# Patient Record
Sex: FEMALE | Race: BLACK OR AFRICAN AMERICAN | Employment: FULL TIME | URBAN - METROPOLITAN AREA
[De-identification: names, ages, dates, MRNs, and addresses within clinical notes are randomized per-mention and may not be internally consistent; named-entity substitution may affect disease eponyms.]

---

## 2017-09-25 ENCOUNTER — HOSPITAL ENCOUNTER (OUTPATIENT)
Dept: MAMMOGRAPHY | Age: 37
Discharge: HOME | End: 2017-09-26
Attending: FAMILY MEDICINE | Admitting: FAMILY MEDICINE

## 2017-09-25 DIAGNOSIS — Z12.31 VISIT FOR SCREENING MAMMOGRAM: ICD-10-CM

## 2017-10-09 ENCOUNTER — HOSPITAL ENCOUNTER (OUTPATIENT)
Dept: OTHER | Age: 37
Discharge: HOME | End: 2017-10-09

## 2019-11-15 ENCOUNTER — HOSPITAL ENCOUNTER (OUTPATIENT)
Dept: MAMMOGRAPHY | Age: 39
Discharge: HOME | End: 2019-11-15
Payer: COMMERCIAL

## 2019-11-15 DIAGNOSIS — Z12.31 ENCOUNTER FOR SCREENING MAMMOGRAM FOR BREAST CANCER: ICD-10-CM

## 2019-11-15 PROCEDURE — 77063 BREAST TOMOSYNTHESIS BI: CPT

## 2021-02-15 ENCOUNTER — ANESTHESIA EVENT (OUTPATIENT)
Dept: OPERATING ROOM | Age: 41
End: 2021-02-15
Payer: COMMERCIAL

## 2021-02-15 NOTE — ANESTHESIA PRE PROCEDURE
Counseling given: Not Answered      Vital Signs (Current): There were no vitals filed for this visit. BP Readings from Last 3 Encounters:   05/05/18 (!) 139/90   03/15/17 (!) 130/94   10/31/14 116/78       NPO Status:                                                                                 BMI:   Wt Readings from Last 3 Encounters:   05/05/18 185 lb (83.9 kg)   03/15/17 185 lb (83.9 kg)   10/31/14 191 lb (86.6 kg)     There is no height or weight on file to calculate BMI.    CBC:   Lab Results   Component Value Date    WBC 8.2 07/27/2012    RBC 4.47 07/27/2012    HGB 12.4 07/27/2012    HCT 36.8 07/27/2012    MCV 82.2 07/27/2012    RDW 13.1 07/27/2012     07/27/2012       CMP:   Lab Results   Component Value Date     07/27/2012    K 4.2 07/27/2012     07/27/2012    CO2 26 07/27/2012    BUN 10 07/27/2012    CREATININE 0.8 07/27/2012    GFRAA >60 07/27/2012    LABGLOM >60 07/27/2012    PROT 7.1 07/27/2012    CALCIUM 9.7 07/27/2012    BILITOT 0.3 07/27/2012    ALKPHOS 111 07/27/2012    AST 21 07/27/2012    ALT 19 07/27/2012       POC Tests: No results for input(s): POCGLU, POCNA, POCK, POCCL, POCBUN, POCHEMO, POCHCT in the last 72 hours.     Coags: No results found for: PROTIME, INR, APTT    HCG (If Applicable): No results found for: PREGTESTUR, PREGSERUM, HCG, HCGQUANT     ABGs: No results found for: PHART, PO2ART, CEY2QPG, NIL3ZNH, BEART, J9WIUNED     Type & Screen (If Applicable):  No results found for: LABABO, LABRH    Drug/Infectious Status (If Applicable):  No results found for: HIV, HEPCAB    COVID-19 Screening (If Applicable): No results found for: COVID19      Anesthesia Evaluation    Airway: Mallampati: II  TM distance: >3 FB   Neck ROM: full  Mouth opening: > = 3 FB Dental:          Pulmonary:normal exam    (+) asthma:                            Cardiovascular:  Exercise tolerance: good (>4 METS), Beta Blocker:  Not on Beta Blocker         Neuro/Psych:               GI/Hepatic/Renal:             Endo/Other:                     Abdominal:           Vascular:                                      Anesthesia Plan      general     ASA 2       Induction: intravenous. MIPS: Postoperative opioids intended. Anesthetic plan and risks discussed with patient. Plan discussed with CRNA. Attending anesthesiologist reviewed and agrees with Pre Eval content     Pre Anesthesia Assessment complete.  Chart reviewed on 2/15/2021        BRAXTON Knutson - CRNA   2/15/2021

## 2021-02-15 NOTE — PROGRESS NOTES
Surgery:    [email or web address]                      Arrival time: 0800  Nothing to eat or drink after midnight unless instructed to take certain medications by the doctor or the nurse the am of surgery  Arrive at the front information desk -1st floor /\[facility]\"" is on [address] [facility]  Please leave money and all other valuables at home. Wear comfortable clothing. If you wear contacts please bring a case. No make up. You may be asked to remove rings or body piercing. Please bring insurance cards and picture ID am of procedure. Please bring any consent or paper work from your doctor. If you become ill,such as a cold, sore throat or fever contact your doctor. Please bathe or shower am of procedure.   Medications to take AM of procedure:     Any questions call \[facility]\""  [phone]

## 2021-02-16 ENCOUNTER — ANESTHESIA (OUTPATIENT)
Dept: OPERATING ROOM | Age: 41
End: 2021-02-16
Payer: COMMERCIAL

## 2021-02-16 ENCOUNTER — HOSPITAL ENCOUNTER (OUTPATIENT)
Age: 41
Setting detail: OBSERVATION
Discharge: HOME | End: 2021-02-17
Attending: OBSTETRICS & GYNECOLOGY | Admitting: OBSTETRICS & GYNECOLOGY
Payer: COMMERCIAL

## 2021-02-16 VITALS
DIASTOLIC BLOOD PRESSURE: 102 MMHG | OXYGEN SATURATION: 97 % | SYSTOLIC BLOOD PRESSURE: 134 MMHG | RESPIRATION RATE: 2 BRPM | TEMPERATURE: 98.6 F

## 2021-02-16 DIAGNOSIS — N92.0 EXCESSIVE AND FREQUENT MENSTRUATION: ICD-10-CM

## 2021-02-16 DIAGNOSIS — G89.18 POST-OP PAIN: Primary | ICD-10-CM

## 2021-02-16 PROBLEM — N94.6 MENORRHALGIA: Status: ACTIVE | Noted: 2021-02-16

## 2021-02-16 LAB
BASOPHILS ABSOLUTE: 0.1 K/CU MM
BASOPHILS RELATIVE PERCENT: 0.9 % (ref 0–1)
DIFFERENTIAL TYPE: ABNORMAL
EOSINOPHILS ABSOLUTE: 0.1 K/CU MM
EOSINOPHILS RELATIVE PERCENT: 2.1 % (ref 0–3)
HCT VFR BLD CALC: 37.1 % (ref 37–47)
HEMOGLOBIN: 12.2 GM/DL (ref 12.5–16)
IMMATURE NEUTROPHIL %: 0.3 % (ref 0–0.43)
LYMPHOCYTES ABSOLUTE: 2.1 K/CU MM
LYMPHOCYTES RELATIVE PERCENT: 31.5 % (ref 24–44)
MCH RBC QN AUTO: 26.5 PG (ref 27–31)
MCHC RBC AUTO-ENTMCNC: 32.9 % (ref 32–36)
MCV RBC AUTO: 80.5 FL (ref 78–100)
MONOCYTES ABSOLUTE: 0.5 K/CU MM
MONOCYTES RELATIVE PERCENT: 7.4 % (ref 0–4)
NUCLEATED RBC %: 0 %
PDW BLD-RTO: 13.7 % (ref 11.7–14.9)
PLATELET # BLD: 281 K/CU MM (ref 140–440)
PMV BLD AUTO: 10.5 FL (ref 7.5–11.1)
PREGNANCY TEST URINE, POC: NEGATIVE
RBC # BLD: 4.61 M/CU MM (ref 4.2–5.4)
SEGMENTED NEUTROPHILS ABSOLUTE COUNT: 3.8 K/CU MM
SEGMENTED NEUTROPHILS RELATIVE PERCENT: 57.8 % (ref 36–66)
TOTAL IMMATURE NEUTOROPHIL: 0.02 K/CU MM
TOTAL NUCLEATED RBC: 0 K/CU MM
WBC # BLD: 6.6 K/CU MM (ref 4–10.5)

## 2021-02-16 PROCEDURE — G0378 HOSPITAL OBSERVATION PER HR: HCPCS

## 2021-02-16 PROCEDURE — 6370000000 HC RX 637 (ALT 250 FOR IP): Performed by: OBSTETRICS & GYNECOLOGY

## 2021-02-16 PROCEDURE — 6360000002 HC RX W HCPCS: Performed by: ANESTHESIOLOGY

## 2021-02-16 PROCEDURE — 2580000003 HC RX 258: Performed by: ANESTHESIOLOGY

## 2021-02-16 PROCEDURE — 85025 COMPLETE CBC W/AUTO DIFF WBC: CPT

## 2021-02-16 PROCEDURE — 3600000014 HC SURGERY LEVEL 4 ADDTL 15MIN: Performed by: OBSTETRICS & GYNECOLOGY

## 2021-02-16 PROCEDURE — 2580000003 HC RX 258: Performed by: NURSE ANESTHETIST, CERTIFIED REGISTERED

## 2021-02-16 PROCEDURE — 2580000003 HC RX 258: Performed by: OBSTETRICS & GYNECOLOGY

## 2021-02-16 PROCEDURE — 81025 URINE PREGNANCY TEST: CPT

## 2021-02-16 PROCEDURE — 2720000010 HC SURG SUPPLY STERILE: Performed by: OBSTETRICS & GYNECOLOGY

## 2021-02-16 PROCEDURE — 6360000002 HC RX W HCPCS: Performed by: OBSTETRICS & GYNECOLOGY

## 2021-02-16 PROCEDURE — 7100000000 HC PACU RECOVERY - FIRST 15 MIN: Performed by: OBSTETRICS & GYNECOLOGY

## 2021-02-16 PROCEDURE — 3700000000 HC ANESTHESIA ATTENDED CARE: Performed by: OBSTETRICS & GYNECOLOGY

## 2021-02-16 PROCEDURE — 3600000004 HC SURGERY LEVEL 4 BASE: Performed by: OBSTETRICS & GYNECOLOGY

## 2021-02-16 PROCEDURE — 2500000003 HC RX 250 WO HCPCS: Performed by: NURSE ANESTHETIST, CERTIFIED REGISTERED

## 2021-02-16 PROCEDURE — 6360000002 HC RX W HCPCS: Performed by: NURSE ANESTHETIST, CERTIFIED REGISTERED

## 2021-02-16 PROCEDURE — 7100000001 HC PACU RECOVERY - ADDTL 15 MIN: Performed by: OBSTETRICS & GYNECOLOGY

## 2021-02-16 PROCEDURE — 3700000001 HC ADD 15 MINUTES (ANESTHESIA): Performed by: OBSTETRICS & GYNECOLOGY

## 2021-02-16 PROCEDURE — 2500000003 HC RX 250 WO HCPCS: Performed by: OBSTETRICS & GYNECOLOGY

## 2021-02-16 PROCEDURE — 2709999900 HC NON-CHARGEABLE SUPPLY: Performed by: OBSTETRICS & GYNECOLOGY

## 2021-02-16 PROCEDURE — 88307 TISSUE EXAM BY PATHOLOGIST: CPT | Performed by: PATHOLOGY

## 2021-02-16 RX ORDER — IBUPROFEN 800 MG/1
800 TABLET ORAL EVERY 8 HOURS PRN
Status: DISCONTINUED | OUTPATIENT
Start: 2021-02-16 | End: 2021-02-17 | Stop reason: HOSPADM

## 2021-02-16 RX ORDER — PROPOFOL 10 MG/ML
INJECTION, EMULSION INTRAVENOUS PRN
Status: DISCONTINUED | OUTPATIENT
Start: 2021-02-16 | End: 2021-02-16 | Stop reason: SDUPTHER

## 2021-02-16 RX ORDER — SODIUM CHLORIDE 0.9 % (FLUSH) 0.9 %
10 SYRINGE (ML) INJECTION PRN
Status: DISCONTINUED | OUTPATIENT
Start: 2021-02-16 | End: 2021-02-17 | Stop reason: HOSPADM

## 2021-02-16 RX ORDER — FENTANYL CITRATE 50 UG/ML
50 INJECTION, SOLUTION INTRAMUSCULAR; INTRAVENOUS EVERY 5 MIN PRN
Status: DISCONTINUED | OUTPATIENT
Start: 2021-02-16 | End: 2021-02-16 | Stop reason: HOSPADM

## 2021-02-16 RX ORDER — ROCURONIUM BROMIDE 10 MG/ML
INJECTION, SOLUTION INTRAVENOUS PRN
Status: DISCONTINUED | OUTPATIENT
Start: 2021-02-16 | End: 2021-02-16 | Stop reason: SDUPTHER

## 2021-02-16 RX ORDER — ONDANSETRON 2 MG/ML
4 INJECTION INTRAMUSCULAR; INTRAVENOUS EVERY 6 HOURS PRN
Status: DISCONTINUED | OUTPATIENT
Start: 2021-02-16 | End: 2021-02-17 | Stop reason: HOSPADM

## 2021-02-16 RX ORDER — ONDANSETRON 2 MG/ML
4 INJECTION INTRAMUSCULAR; INTRAVENOUS
Status: DISCONTINUED | OUTPATIENT
Start: 2021-02-16 | End: 2021-02-16 | Stop reason: HOSPADM

## 2021-02-16 RX ORDER — PROMETHAZINE HYDROCHLORIDE 25 MG/ML
6.25 INJECTION, SOLUTION INTRAMUSCULAR; INTRAVENOUS
Status: DISCONTINUED | OUTPATIENT
Start: 2021-02-16 | End: 2021-02-16 | Stop reason: HOSPADM

## 2021-02-16 RX ORDER — ACETAMINOPHEN 325 MG/1
650 TABLET ORAL EVERY 4 HOURS PRN
Status: DISCONTINUED | OUTPATIENT
Start: 2021-02-16 | End: 2021-02-17 | Stop reason: HOSPADM

## 2021-02-16 RX ORDER — HYDROCODONE BITARTRATE AND ACETAMINOPHEN 5; 325 MG/1; MG/1
1 TABLET ORAL PRN
Status: DISCONTINUED | OUTPATIENT
Start: 2021-02-16 | End: 2021-02-16 | Stop reason: HOSPADM

## 2021-02-16 RX ORDER — FENTANYL CITRATE 50 UG/ML
INJECTION, SOLUTION INTRAMUSCULAR; INTRAVENOUS PRN
Status: DISCONTINUED | OUTPATIENT
Start: 2021-02-16 | End: 2021-02-16 | Stop reason: SDUPTHER

## 2021-02-16 RX ORDER — ONDANSETRON 2 MG/ML
INJECTION INTRAMUSCULAR; INTRAVENOUS PRN
Status: DISCONTINUED | OUTPATIENT
Start: 2021-02-16 | End: 2021-02-16 | Stop reason: SDUPTHER

## 2021-02-16 RX ORDER — 0.9 % SODIUM CHLORIDE 0.9 %
500 INTRAVENOUS SOLUTION INTRAVENOUS
Status: DISCONTINUED | OUTPATIENT
Start: 2021-02-16 | End: 2021-02-16 | Stop reason: HOSPADM

## 2021-02-16 RX ORDER — SODIUM CHLORIDE, SODIUM LACTATE, POTASSIUM CHLORIDE, CALCIUM CHLORIDE 600; 310; 30; 20 MG/100ML; MG/100ML; MG/100ML; MG/100ML
INJECTION, SOLUTION INTRAVENOUS CONTINUOUS PRN
Status: DISCONTINUED | OUTPATIENT
Start: 2021-02-16 | End: 2021-02-16 | Stop reason: SDUPTHER

## 2021-02-16 RX ORDER — OXYCODONE HYDROCHLORIDE AND ACETAMINOPHEN 5; 325 MG/1; MG/1
2 TABLET ORAL EVERY 4 HOURS PRN
Status: DISCONTINUED | OUTPATIENT
Start: 2021-02-16 | End: 2021-02-17 | Stop reason: HOSPADM

## 2021-02-16 RX ORDER — CELECOXIB 200 MG/1
200 CAPSULE ORAL ONCE
Status: COMPLETED | OUTPATIENT
Start: 2021-02-16 | End: 2021-02-16

## 2021-02-16 RX ORDER — LIDOCAINE HYDROCHLORIDE 20 MG/ML
INJECTION, SOLUTION INTRAVENOUS PRN
Status: DISCONTINUED | OUTPATIENT
Start: 2021-02-16 | End: 2021-02-16 | Stop reason: SDUPTHER

## 2021-02-16 RX ORDER — DEXAMETHASONE SODIUM PHOSPHATE 4 MG/ML
INJECTION, SOLUTION INTRA-ARTICULAR; INTRALESIONAL; INTRAMUSCULAR; INTRAVENOUS; SOFT TISSUE PRN
Status: DISCONTINUED | OUTPATIENT
Start: 2021-02-16 | End: 2021-02-16 | Stop reason: SDUPTHER

## 2021-02-16 RX ORDER — ALBUTEROL SULFATE 90 UG/1
2 AEROSOL, METERED RESPIRATORY (INHALATION) EVERY 4 HOURS PRN
Status: DISCONTINUED | OUTPATIENT
Start: 2021-02-16 | End: 2021-02-17 | Stop reason: HOSPADM

## 2021-02-16 RX ORDER — SODIUM CHLORIDE, SODIUM LACTATE, POTASSIUM CHLORIDE, CALCIUM CHLORIDE 600; 310; 30; 20 MG/100ML; MG/100ML; MG/100ML; MG/100ML
INJECTION, SOLUTION INTRAVENOUS CONTINUOUS
Status: DISCONTINUED | OUTPATIENT
Start: 2021-02-16 | End: 2021-02-17 | Stop reason: HOSPADM

## 2021-02-16 RX ORDER — HYDROMORPHONE HCL 110MG/55ML
PATIENT CONTROLLED ANALGESIA SYRINGE INTRAVENOUS PRN
Status: DISCONTINUED | OUTPATIENT
Start: 2021-02-16 | End: 2021-02-16 | Stop reason: SDUPTHER

## 2021-02-16 RX ORDER — SODIUM CHLORIDE 0.9 % (FLUSH) 0.9 %
10 SYRINGE (ML) INJECTION EVERY 12 HOURS SCHEDULED
Status: DISCONTINUED | OUTPATIENT
Start: 2021-02-16 | End: 2021-02-17 | Stop reason: HOSPADM

## 2021-02-16 RX ORDER — OXYCODONE HYDROCHLORIDE AND ACETAMINOPHEN 5; 325 MG/1; MG/1
1 TABLET ORAL EVERY 4 HOURS PRN
Status: DISCONTINUED | OUTPATIENT
Start: 2021-02-16 | End: 2021-02-17 | Stop reason: HOSPADM

## 2021-02-16 RX ORDER — DIPHENHYDRAMINE HYDROCHLORIDE 50 MG/ML
12.5 INJECTION INTRAMUSCULAR; INTRAVENOUS
Status: DISCONTINUED | OUTPATIENT
Start: 2021-02-16 | End: 2021-02-16 | Stop reason: HOSPADM

## 2021-02-16 RX ORDER — KETAMINE HCL 50MG/ML(1)
SYRINGE (ML) INTRAVENOUS PRN
Status: DISCONTINUED | OUTPATIENT
Start: 2021-02-16 | End: 2021-02-16 | Stop reason: SDUPTHER

## 2021-02-16 RX ORDER — ONDANSETRON 4 MG/1
4 TABLET, ORALLY DISINTEGRATING ORAL EVERY 8 HOURS PRN
Status: DISCONTINUED | OUTPATIENT
Start: 2021-02-16 | End: 2021-02-17 | Stop reason: HOSPADM

## 2021-02-16 RX ORDER — BUPIVACAINE HYDROCHLORIDE AND EPINEPHRINE 5; 5 MG/ML; UG/ML
INJECTION, SOLUTION EPIDURAL; INTRACAUDAL; PERINEURAL
Status: COMPLETED | OUTPATIENT
Start: 2021-02-16 | End: 2021-02-16

## 2021-02-16 RX ORDER — SODIUM CHLORIDE, SODIUM LACTATE, POTASSIUM CHLORIDE, CALCIUM CHLORIDE 600; 310; 30; 20 MG/100ML; MG/100ML; MG/100ML; MG/100ML
INJECTION, SOLUTION INTRAVENOUS CONTINUOUS
Status: DISCONTINUED | OUTPATIENT
Start: 2021-02-16 | End: 2021-02-16

## 2021-02-16 RX ORDER — PHENAZOPYRIDINE HYDROCHLORIDE 100 MG/1
200 TABLET, FILM COATED ORAL ONCE
Status: COMPLETED | OUTPATIENT
Start: 2021-02-16 | End: 2021-02-16

## 2021-02-16 RX ORDER — ACETAMINOPHEN 500 MG
1000 TABLET ORAL ONCE
Status: COMPLETED | OUTPATIENT
Start: 2021-02-16 | End: 2021-02-16

## 2021-02-16 RX ORDER — HYDROMORPHONE HCL 110MG/55ML
0.25 PATIENT CONTROLLED ANALGESIA SYRINGE INTRAVENOUS
Status: DISCONTINUED | OUTPATIENT
Start: 2021-02-16 | End: 2021-02-17 | Stop reason: HOSPADM

## 2021-02-16 RX ORDER — BUPIVACAINE HYDROCHLORIDE 5 MG/ML
INJECTION, SOLUTION PERINEURAL
Status: COMPLETED | OUTPATIENT
Start: 2021-02-16 | End: 2021-02-16

## 2021-02-16 RX ORDER — HYDROMORPHONE HCL 110MG/55ML
0.5 PATIENT CONTROLLED ANALGESIA SYRINGE INTRAVENOUS EVERY 5 MIN PRN
Status: DISCONTINUED | OUTPATIENT
Start: 2021-02-16 | End: 2021-02-16 | Stop reason: HOSPADM

## 2021-02-16 RX ORDER — HYDROMORPHONE HCL 110MG/55ML
0.5 PATIENT CONTROLLED ANALGESIA SYRINGE INTRAVENOUS
Status: DISCONTINUED | OUTPATIENT
Start: 2021-02-16 | End: 2021-02-17 | Stop reason: HOSPADM

## 2021-02-16 RX ORDER — HYDROCODONE BITARTRATE AND ACETAMINOPHEN 5; 325 MG/1; MG/1
2 TABLET ORAL EVERY 6 HOURS PRN
Status: DISCONTINUED | OUTPATIENT
Start: 2021-02-16 | End: 2021-02-16 | Stop reason: HOSPADM

## 2021-02-16 RX ORDER — MEPERIDINE HYDROCHLORIDE 25 MG/ML
12.5 INJECTION INTRAMUSCULAR; INTRAVENOUS; SUBCUTANEOUS EVERY 5 MIN PRN
Status: DISCONTINUED | OUTPATIENT
Start: 2021-02-16 | End: 2021-02-16 | Stop reason: HOSPADM

## 2021-02-16 RX ORDER — HYDRALAZINE HYDROCHLORIDE 20 MG/ML
5 INJECTION INTRAMUSCULAR; INTRAVENOUS EVERY 10 MIN PRN
Status: DISCONTINUED | OUTPATIENT
Start: 2021-02-16 | End: 2021-02-16 | Stop reason: HOSPADM

## 2021-02-16 RX ORDER — LABETALOL HYDROCHLORIDE 5 MG/ML
5 INJECTION, SOLUTION INTRAVENOUS EVERY 10 MIN PRN
Status: DISCONTINUED | OUTPATIENT
Start: 2021-02-16 | End: 2021-02-16 | Stop reason: HOSPADM

## 2021-02-16 RX ADMIN — PROPOFOL 160 MG: 10 INJECTION, EMULSION INTRAVENOUS at 09:22

## 2021-02-16 RX ADMIN — Medication 50 MG: at 09:22

## 2021-02-16 RX ADMIN — LIDOCAINE HYDROCHLORIDE 100 MG: 20 INJECTION, SOLUTION INTRAVENOUS at 09:22

## 2021-02-16 RX ADMIN — HYDROMORPHONE HYDROCHLORIDE 0.4 MG: 2 INJECTION INTRAMUSCULAR; INTRAVENOUS; SUBCUTANEOUS at 10:55

## 2021-02-16 RX ADMIN — SODIUM CHLORIDE, POTASSIUM CHLORIDE, SODIUM LACTATE AND CALCIUM CHLORIDE: 600; 310; 30; 20 INJECTION, SOLUTION INTRAVENOUS at 11:40

## 2021-02-16 RX ADMIN — SODIUM CHLORIDE, POTASSIUM CHLORIDE, SODIUM LACTATE AND CALCIUM CHLORIDE: 600; 310; 30; 20 INJECTION, SOLUTION INTRAVENOUS at 18:21

## 2021-02-16 RX ADMIN — PHENAZOPYRIDINE HYDROCHLORIDE 200 MG: 100 TABLET ORAL at 09:10

## 2021-02-16 RX ADMIN — OXYCODONE HYDROCHLORIDE AND ACETAMINOPHEN 1 TABLET: 5; 325 TABLET ORAL at 23:22

## 2021-02-16 RX ADMIN — FENTANYL CITRATE 50 MCG: 50 INJECTION INTRAMUSCULAR; INTRAVENOUS at 11:48

## 2021-02-16 RX ADMIN — IBUPROFEN 800 MG: 800 TABLET, FILM COATED ORAL at 21:52

## 2021-02-16 RX ADMIN — ROCURONIUM BROMIDE 50 MG: 10 INJECTION INTRAVENOUS at 09:22

## 2021-02-16 RX ADMIN — SODIUM CHLORIDE, POTASSIUM CHLORIDE, SODIUM LACTATE AND CALCIUM CHLORIDE: 600; 310; 30; 20 INJECTION, SOLUTION INTRAVENOUS at 09:16

## 2021-02-16 RX ADMIN — SODIUM CHLORIDE, POTASSIUM CHLORIDE, SODIUM LACTATE AND CALCIUM CHLORIDE: 600; 310; 30; 20 INJECTION, SOLUTION INTRAVENOUS at 09:02

## 2021-02-16 RX ADMIN — ROCURONIUM BROMIDE 20 MG: 10 INJECTION INTRAVENOUS at 10:06

## 2021-02-16 RX ADMIN — CEFAZOLIN SODIUM 2 MG: 10 INJECTION, POWDER, FOR SOLUTION INTRAVENOUS at 09:31

## 2021-02-16 RX ADMIN — FENTANYL CITRATE 100 MCG: 50 INJECTION INTRAMUSCULAR; INTRAVENOUS at 09:22

## 2021-02-16 RX ADMIN — ONDANSETRON 4 MG: 2 INJECTION INTRAMUSCULAR; INTRAVENOUS at 09:22

## 2021-02-16 RX ADMIN — DEXAMETHASONE SODIUM PHOSPHATE 8 MG: 4 INJECTION, SOLUTION INTRAMUSCULAR; INTRAVENOUS at 09:22

## 2021-02-16 RX ADMIN — SUGAMMADEX 172 MG: 100 INJECTION, SOLUTION INTRAVENOUS at 10:55

## 2021-02-16 RX ADMIN — CELECOXIB 200 MG: 200 CAPSULE ORAL at 09:10

## 2021-02-16 RX ADMIN — FENTANYL CITRATE 50 MCG: 50 INJECTION INTRAMUSCULAR; INTRAVENOUS at 11:31

## 2021-02-16 RX ADMIN — ACETAMINOPHEN 1000 MG: 500 TABLET ORAL at 09:10

## 2021-02-16 RX ADMIN — OXYCODONE HYDROCHLORIDE AND ACETAMINOPHEN 1 TABLET: 5; 325 TABLET ORAL at 18:20

## 2021-02-16 RX ADMIN — OXYCODONE HYDROCHLORIDE AND ACETAMINOPHEN 2 TABLET: 5; 325 TABLET ORAL at 13:28

## 2021-02-16 ASSESSMENT — PULMONARY FUNCTION TESTS
PIF_VALUE: 0
PIF_VALUE: 28
PIF_VALUE: 0
PIF_VALUE: 18
PIF_VALUE: 27
PIF_VALUE: 13
PIF_VALUE: 0
PIF_VALUE: 13
PIF_VALUE: 13
PIF_VALUE: 28
PIF_VALUE: 0
PIF_VALUE: 27
PIF_VALUE: 13
PIF_VALUE: 0
PIF_VALUE: 28
PIF_VALUE: 0
PIF_VALUE: 30
PIF_VALUE: 0
PIF_VALUE: 0
PIF_VALUE: 28
PIF_VALUE: 0
PIF_VALUE: 13
PIF_VALUE: 5
PIF_VALUE: 0
PIF_VALUE: 28
PIF_VALUE: 19
PIF_VALUE: 13
PIF_VALUE: 0
PIF_VALUE: 0
PIF_VALUE: 29
PIF_VALUE: 0
PIF_VALUE: 28
PIF_VALUE: 13
PIF_VALUE: 0
PIF_VALUE: 0
PIF_VALUE: 13
PIF_VALUE: 0
PIF_VALUE: 26
PIF_VALUE: 0
PIF_VALUE: 19
PIF_VALUE: 13
PIF_VALUE: 30
PIF_VALUE: 0
PIF_VALUE: 31
PIF_VALUE: 0
PIF_VALUE: 0

## 2021-02-16 ASSESSMENT — PAIN DESCRIPTION - ORIENTATION
ORIENTATION: LEFT
ORIENTATION: LOWER
ORIENTATION: MID

## 2021-02-16 ASSESSMENT — PAIN DESCRIPTION - LOCATION
LOCATION: ABDOMEN
LOCATION: ABDOMEN;SHOULDER
LOCATION: ABDOMEN

## 2021-02-16 ASSESSMENT — PAIN SCALES - GENERAL
PAINLEVEL_OUTOF10: 9
PAINLEVEL_OUTOF10: 6
PAINLEVEL_OUTOF10: 5
PAINLEVEL_OUTOF10: 3
PAINLEVEL_OUTOF10: 0
PAINLEVEL_OUTOF10: 1
PAINLEVEL_OUTOF10: 0
PAINLEVEL_OUTOF10: 10

## 2021-02-16 ASSESSMENT — PAIN DESCRIPTION - FREQUENCY
FREQUENCY: INTERMITTENT
FREQUENCY: CONTINUOUS

## 2021-02-16 ASSESSMENT — PAIN DESCRIPTION - DESCRIPTORS
DESCRIPTORS: ACHING;DISCOMFORT
DESCRIPTORS: ACHING;DISCOMFORT
DESCRIPTORS: DISCOMFORT

## 2021-02-16 ASSESSMENT — PAIN - FUNCTIONAL ASSESSMENT: PAIN_FUNCTIONAL_ASSESSMENT: ACTIVITIES ARE NOT PREVENTED

## 2021-02-16 ASSESSMENT — PAIN DESCRIPTION - ONSET: ONSET: GRADUAL

## 2021-02-16 ASSESSMENT — PAIN DESCRIPTION - PROGRESSION
CLINICAL_PROGRESSION: NOT CHANGED
CLINICAL_PROGRESSION: GRADUALLY IMPROVING
CLINICAL_PROGRESSION: GRADUALLY WORSENING

## 2021-02-16 ASSESSMENT — PAIN DESCRIPTION - PAIN TYPE: TYPE: SURGICAL PAIN

## 2021-02-16 NOTE — FLOWSHEET NOTE
Pt from PCU to MB unit via bed. Pt condition stable. Informed pt if she needs nurse to call number listed on white board, pt verbalizes understanding. Lung sounds clear bilat. Pt instructed to C&DB every hour while awake. Instructed pt on how to splint abd while coughing. Pt does so at this time. Abd soft with abd dressings dry and intact--dressings are lare adhesive bandages, total of 3. SCD on bilat below knees. Bro patent with clear oarnge urine. IV infusing without difficulty. IV site without redness or edema. Dressing is dry and intact. Pt oriented to room. Pt aware of how to use phone, tv and call light. Instructed pt on how to order meals. Pt instructed not to get out of bed without assistance, pt verbalizes understanding. Fresh water pitcher given to pt.

## 2021-02-16 NOTE — PLAN OF CARE
Problem: Pain:  Goal: Pain level will decrease  Description: Pain level will decrease  Outcome: Ongoing  Goal: Control of acute pain  Description: Control of acute pain  Outcome: Ongoing  Goal: Control of chronic pain  Description: Control of chronic pain  Outcome: Ongoing     Problem: Discharge Planning:  Goal: Discharged to appropriate level of care  Description: Discharged to appropriate level of care  Outcome: Ongoing     Problem: Constipation - Risk of:  Goal: Bowel elimination is within specified parameters  Description: Bowel elimination is within specified parameters  Outcome: Ongoing     Problem: Infection - Surgical Site:  Goal: Demonstration of wound healing without infection will improve  Description: Demonstration of wound healing without infection will improve  Outcome: Ongoing     Problem: Pain - Acute:  Goal: Pain level will decrease  Description: Pain level will decrease  Outcome: Ongoing     Problem: Urinary Elimination - Impaired:  Goal: Urinary elimination within specified parameters  Description: Urinary elimination within specified parameters  Outcome: Ongoing     Problem: Venous Thromboembolism:  Goal: Will show no signs or symptoms of venous thromboembolism  Description: Will show no signs or symptoms of venous thromboembolism  Outcome: Ongoing  Goal: Absence of signs or symptoms of impaired coagulation  Description: Absence of signs or symptoms of impaired coagulation  Outcome: Ongoing

## 2021-02-16 NOTE — H&P
hepatosplenomegally    Extremities:  No clubbing, cyanosis, or edema      ASSESSMENT AND PLAN:      1. Patient is a 38 y.o. female with above specified procedure planned  with anesthesia    2. Procedure options, risks and benefits reviewed with patient. Patient expresses understanding.     3.  The patient agrees to blood products in emergent situation

## 2021-02-16 NOTE — BRIEF OP NOTE
Department of Obstetrics and Gynecology   Brief Operative Report        Pre-operative Diagnosis:  Menorrhagia, fibroids  Post-operative Diagnosis:  Same    Procedure: TLH, CYSTO, IRVING, BIlateral salpingectomy  Surgeon:  Iris Collado  Assistant(s): Bere Toussaint  Anesthesia:  gen  Findings:   Estimated blood loss:  300  Specimens: uterus, cervix, FT, ovaries    Complications:  none    Condition:  good        See dictated operative report for full details.

## 2021-02-16 NOTE — FLOWSHEET NOTE
Pt up and ambulating in room. Bro catheter removed per pt request. Pt tolerates without difficult. Catheter intact. Pt instructed that the first time she needs to void to call nurse, pt verbalizes understanding.

## 2021-02-16 NOTE — PROGRESS NOTES
Dr Qasim Edwards at bedside, verified with patient that she would receive blood products if necessary.

## 2021-02-17 VITALS
OXYGEN SATURATION: 98 % | WEIGHT: 190 LBS | HEIGHT: 66 IN | SYSTOLIC BLOOD PRESSURE: 136 MMHG | DIASTOLIC BLOOD PRESSURE: 88 MMHG | HEART RATE: 69 BPM | BODY MASS INDEX: 30.53 KG/M2 | TEMPERATURE: 98.7 F | RESPIRATION RATE: 16 BRPM

## 2021-02-17 LAB
HCT VFR BLD CALC: 32 % (ref 37–47)
HEMOGLOBIN: 10.4 GM/DL (ref 12.5–16)
MCH RBC QN AUTO: 25.9 PG (ref 27–31)
MCHC RBC AUTO-ENTMCNC: 32.5 % (ref 32–36)
MCV RBC AUTO: 79.8 FL (ref 78–100)
PDW BLD-RTO: 13.8 % (ref 11.7–14.9)
PLATELET # BLD: 251 K/CU MM (ref 140–440)
PMV BLD AUTO: 10.5 FL (ref 7.5–11.1)
RBC # BLD: 4.01 M/CU MM (ref 4.2–5.4)
WBC # BLD: 12.1 K/CU MM (ref 4–10.5)

## 2021-02-17 PROCEDURE — 85027 COMPLETE CBC AUTOMATED: CPT

## 2021-02-17 PROCEDURE — 6370000000 HC RX 637 (ALT 250 FOR IP): Performed by: OBSTETRICS & GYNECOLOGY

## 2021-02-17 PROCEDURE — G0378 HOSPITAL OBSERVATION PER HR: HCPCS

## 2021-02-17 RX ORDER — HYDROCODONE BITARTRATE AND ACETAMINOPHEN 5; 325 MG/1; MG/1
1 TABLET ORAL EVERY 6 HOURS PRN
Qty: 15 TABLET | Refills: 0 | Status: SHIPPED | OUTPATIENT
Start: 2021-02-17 | End: 2021-02-24

## 2021-02-17 RX ORDER — IBUPROFEN 800 MG/1
800 TABLET ORAL EVERY 8 HOURS PRN
Qty: 60 TABLET | Refills: 1 | Status: SHIPPED | OUTPATIENT
Start: 2021-02-17 | End: 2021-08-25

## 2021-02-17 RX ADMIN — OXYCODONE HYDROCHLORIDE AND ACETAMINOPHEN 1 TABLET: 5; 325 TABLET ORAL at 03:45

## 2021-02-17 RX ADMIN — IBUPROFEN 800 MG: 800 TABLET, FILM COATED ORAL at 07:02

## 2021-02-17 RX ADMIN — OXYCODONE HYDROCHLORIDE AND ACETAMINOPHEN 1 TABLET: 5; 325 TABLET ORAL at 09:05

## 2021-02-17 ASSESSMENT — PAIN SCALES - GENERAL
PAINLEVEL_OUTOF10: 0
PAINLEVEL_OUTOF10: 6
PAINLEVEL_OUTOF10: 0
PAINLEVEL_OUTOF10: 3

## 2021-02-17 ASSESSMENT — PAIN DESCRIPTION - PAIN TYPE: TYPE: SURGICAL PAIN

## 2021-02-17 ASSESSMENT — PAIN DESCRIPTION - ONSET: ONSET: GRADUAL

## 2021-02-17 ASSESSMENT — PAIN - FUNCTIONAL ASSESSMENT: PAIN_FUNCTIONAL_ASSESSMENT: ACTIVITIES ARE NOT PREVENTED

## 2021-02-17 ASSESSMENT — PAIN DESCRIPTION - FREQUENCY: FREQUENCY: INTERMITTENT

## 2021-02-17 NOTE — PLAN OF CARE
Problem: Pain:  Goal: Pain level will decrease  Description: Pain level will decrease  2/17/2021 0750 by Jade Tillman. Tyna Schlatter, RN  Outcome: Completed  2/16/2021 1804 by Jade Tillman. Tyna Schlatter, RN  Outcome: Ongoing  Goal: Control of acute pain  Description: Control of acute pain  2/17/2021 0750 by Jade Tillman. Tyna Schlatter, RN  Outcome: Completed  2/16/2021 1804 by Jade Tillman. Tyna Schlatter, RN  Outcome: Ongoing  Goal: Control of chronic pain  Description: Control of chronic pain  2/17/2021 0750 by Jade Tillman. Tyna Schlatter, RN  Outcome: Completed  2/16/2021 1804 by Jade Tillman. Tyna Schlatter, RN  Outcome: Ongoing     Problem: Discharge Planning:  Goal: Discharged to appropriate level of care  Description: Discharged to appropriate level of care  2/17/2021 0750 by Jade Tillman. Tyna Schlatter, RN  Outcome: Completed  2/16/2021 1804 by Jade Tillman. Tyna Schlatter, RN  Outcome: Ongoing     Problem: Constipation - Risk of:  Goal: Bowel elimination is within specified parameters  Description: Bowel elimination is within specified parameters  2/17/2021 0750 by Jade Tillman. Tyna Schlatter, RN  Outcome: Completed  2/16/2021 1804 by Jade Tillman. Tyna Schlatter, RN  Outcome: Ongoing     Problem: Infection - Surgical Site:  Goal: Demonstration of wound healing without infection will improve  Description: Demonstration of wound healing without infection will improve  2/17/2021 0750 by Jade Tillman. Tyna Schlatter, RN  Outcome: Completed  2/16/2021 1804 by Jade Tillman. Tyna Schlatter, RN  Outcome: Ongoing     Problem: Pain - Acute:  Goal: Pain level will decrease  Description: Pain level will decrease  2/17/2021 0750 by Jade Tillman. Tyna Schlatter, RN  Outcome: Completed  2/16/2021 1804 by Jade Tillman. Tyna Schlatter, RN  Outcome: Ongoing     Problem: Urinary Elimination - Impaired:  Goal: Urinary elimination within specified parameters  Description: Urinary elimination within specified parameters  2/17/2021 0750 by Jade Tillman. Tyna Schlatter, RN  Outcome: Completed  2/16/2021 1804 by Jade Tillman.  Tyna Schlatter, RN  Outcome: Ongoing     Problem: Venous Thromboembolism:  Goal: Will show no signs or symptoms of venous thromboembolism  Description: Will show no signs or symptoms of venous thromboembolism  2/17/2021 0750 by Birgit Vicente. Irena Sanchez RN  Outcome: Completed  2/16/2021 1804 by Birgit Vicente. Irena Sanchez RN  Outcome: Ongoing  Goal: Absence of signs or symptoms of impaired coagulation  Description: Absence of signs or symptoms of impaired coagulation  2/17/2021 0750 by Birgit Vicente. Irena Sanchez RN  Outcome: Completed  2/16/2021 1804 by Birgit Vicente.  Irena Sanchez RN  Outcome: Ongoing

## 2021-02-17 NOTE — DISCHARGE SUMMARY
Physician Discharge Summary     Patient ID:  Komal Quan  [id number]  12 y.o.  1980    Admit date: 2/16/2021    Discharge date and time:      Admitting Physician: Shaq Mitchell MD    Discharge Diagnoses: Excessive and frequent menstruation [N92.0]  Menorrhalgia [N94.6]    Discharged Condition: good    Indication for Admission: 2/16/21    Procedures Performed: 2/17/21    Hospital Course: Patient was admitted on the day of surgery, and underwent an uncomplicated procedure. Discharge Exam:  BP (!) 140/88   Pulse 89   Temp 98.6 °F (37 °C) (Oral)   Resp 16   Ht 5' 6\" (1.676 m)   Wt 190 lb (86.2 kg)   LMP 02/07/2021   SpO2 100%   BMI 30.67 kg/m²     General Appearance:    Alert, cooperative, no distress, appears stated age   Head:    Normocephalic, without obvious abnormality, atraumatic   Neck:   Supple, symmetrical, trachea midline, no adenopathy;     thyroid:  no enlargement/tenderness/nodules; no carotid    bruit or JVD   Abdomen:     Soft, non-tender, bowel sounds active all four quadrants,     no masses, no organomegaly   Extremities:   Extremities normal, atraumatic, no cyanosis or edema   Pulses:   2+ and symmetric all extremities   Skin:   Skin color, texture, turgor normal, no rashes or lesions       Disposition: home    Patient Instructions: Activity: no driving for 1-2 weeks, no sex for 6 weeks and no heavy lifting for 4 weeks  Diet: regular diet  Wound Care: keep wound clean and dry    Discharge Medication:    Radha Brown   Home Medication Instructions [facility]:[id number]    Printed on:02/17/21 1253   Medication Information                      albuterol sulfate  (90 BASE) MCG/ACT inhaler  Inhale 2 puffs into the lungs every 4 hours as needed for Wheezing                  Follow-up with Dr. Ruthine Boxer in 2 weeks.     Signed:  Kay Christopher  2/17/2021  12:53 PM

## 2021-02-17 NOTE — FLOWSHEET NOTE
Discharge instructions given and reviewed, pt verbalizes understanding. Prescriptions for Norco and Motrin sent to pt pharmacy and pt is aware. Pt verbalizes understanding to make and keep appointment with her OB doctor for her to be seen in 2 weeks and states she has already made appontment. Instructed pt that ID bands need to remain on and once home to properly dispose of them in order to protect their identity. Pt verbalizes understanding.  See After Visit Summary (Discharge Instructions.)

## 2021-02-17 NOTE — FLOWSHEET NOTE
Pt discharged at this time via wheel chair. Condition stable. Pt states pain level is a 0 on 0-10 pain scale. Pt verbalizes understanding to keep appointment with Dr. Ximena Saul that she has already scheduled.

## 2021-02-17 NOTE — PLAN OF CARE
Problem: Pain:  Goal: Pain level will decrease  Description: Pain level will decrease  2/17/2021 0750 by Vinod Roy. Sujatha Celaya RN  Outcome: Completed  2/16/2021 1804 by Vinod Roy. Sujatha Celaya RN  Outcome: Ongoing  Goal: Control of acute pain  Description: Control of acute pain  2/17/2021 0750 by Vinod Roy. Sujatha Celaya RN  Outcome: Completed  2/16/2021 1804 by Vinod Roy. Sujatha Celaya RN  Outcome: Ongoing  Goal: Control of chronic pain  Description: Control of chronic pain  2/17/2021 0750 by Vinod Roy. Sujatha Celaya RN  Outcome: Completed  2/16/2021 1804 by Vinod Roy. Sujatha Celaya RN  Outcome: Ongoing     Problem: Discharge Planning:  Goal: Discharged to appropriate level of care  Description: Discharged to appropriate level of care  2/17/2021 0750 by Vinod Roy. Sujatha Celaya RN  Outcome: Completed  2/16/2021 1804 by Vinod Roy. Sujatha Celaya RN  Outcome: Ongoing     Problem: Constipation - Risk of:  Goal: Bowel elimination is within specified parameters  Description: Bowel elimination is within specified parameters  2/17/2021 0750 by Vinod Roy. Sujatha Celaya RN  Outcome: Completed  2/16/2021 1804 by Vinod Roy. Sujatha Celaya RN  Outcome: Ongoing     Problem: Infection - Surgical Site:  Goal: Demonstration of wound healing without infection will improve  Description: Demonstration of wound healing without infection will improve  2/17/2021 0750 by Vinod Roy. Sujatha Celaya RN  Outcome: Completed  2/16/2021 1804 by Vinod Roy. Sujatha Celaya RN  Outcome: Ongoing     Problem: Pain - Acute:  Goal: Pain level will decrease  Description: Pain level will decrease  2/17/2021 0750 by Vinod Roy. Sujatha Celaya RN  Outcome: Completed  2/16/2021 1804 by Vinod Roy. Sujatha Celaya RN  Outcome: Ongoing     Problem: Urinary Elimination - Impaired:  Goal: Urinary elimination within specified parameters  Description: Urinary elimination within specified parameters  2/17/2021 0750 by Vinod Roy. Aleck Belia, RN  Outcome: Completed  2/16/2021 1804 by Vinod Roy.  Sujatha Celaya RN  Outcome: Ongoing     Problem: Venous Thromboembolism:  Goal: Will show no signs or symptoms of venous thromboembolism  Description: Will show no signs or symptoms of venous thromboembolism  2/17/2021 0750 by Manav Leal. Uche Stewart RN  Outcome: Completed  2/16/2021 1804 by Manav Leal. Uche Stewart RN  Outcome: Ongoing  Goal: Absence of signs or symptoms of impaired coagulation  Description: Absence of signs or symptoms of impaired coagulation  2/17/2021 0750 by Manav Leal. Uche Stewart RN  Outcome: Completed  2/16/2021 1804 by Manav Leal.  Uche Stewart RN  Outcome: Ongoing

## 2021-02-19 NOTE — OP NOTE
[address] [place] [address], [address] [place] [address]                                OPERATIVE REPORT    PATIENT NAME: Navin Rehman                     :        1980  MED REC NO:   [id number]                          ROOM:       [facility]  ACCOUNT NO:   [de-identified]                           ADMIT DATE: 2021  PROVIDER:     Jo Ann Winslow MD    DATE OF PROCEDURE:  2021    PREOPERATIVE DIAGNOSIS:  Abnormal uterine bleeding. POSTOPERATIVE DIAGNOSIS:  Abnormal uterine bleeding. SURGEON:  Jo Ann Winslow MD    ASSIST:  Dafne Dawson MD    ANESTHESIA:  General.    PROCEDURE:  Total laparoscopic hysterectomy with bilateral salpingectomy  and cystoscopy. COMPLICATIONS:  None. CONDITION:  Satisfactory. IV FLUIDS:  See anesthesia records. ESTIMATED BLOOD LOSS:  300 mL. DRAINS:  Bro. PATHOLOGY:  Uterus and cervix with fallopian tubes. NARRATIVE:  The patient was taken to the operating room where anesthesia  was established and was found to be adequate. The patient was placed in  a low lithotomy position utilizing Yellofin stirrups, and prepped and  draped in the usual sterile fashion. A weighted speculum was placed in  the vagina. HILARIO uterine manipulator was affixed with balloon and cone. Care was turned to the abdomen where a small supraumbilical skin  incision was made directly, and a 5-mm trocar and sleeve were inserted  without complications. CO2 insufflation was initiated. The patient was  placed in Trendelenburg position. An 11-mm left lower quadrant port and  5-mm right lower quadrant port were placed under direct visualization  without complications. Bowels were prepped in the upper abdomen. Uterus was elevated with a 5-mm blunt-tip LigaSure. The bilateral round  ligaments, suspensory ligaments, fallopian tubes and broad ligaments  were transected.   A bladder flap was created and advanced beyond the

## 2021-06-03 ENCOUNTER — HOSPITAL ENCOUNTER (OUTPATIENT)
Age: 41
Discharge: HOME | End: 2021-06-03

## 2021-06-03 ENCOUNTER — HOSPITAL ENCOUNTER (OUTPATIENT)
Dept: GENERAL RADIOLOGY | Age: 41
Discharge: HOME | End: 2021-06-03

## 2021-06-03 DIAGNOSIS — R10.84 GENERALIZED ABDOMINAL PAIN: ICD-10-CM

## 2021-06-03 LAB
ALBUMIN SERPL-MCNC: 4.5 GM/DL (ref 3.4–5)
ALP BLD-CCNC: 205 IU/L (ref 40–129)
ALT SERPL-CCNC: 123 U/L (ref 10–40)
ANION GAP SERPL CALCULATED.3IONS-SCNC: 11 MMOL/L (ref 4–16)
AST SERPL-CCNC: 54 IU/L (ref 15–37)
BACTERIA: ABNORMAL /HPF
BASOPHILS ABSOLUTE: 0 K/CU MM
BASOPHILS RELATIVE PERCENT: 0.3 % (ref 0–1)
BILIRUB SERPL-MCNC: 0.4 MG/DL (ref 0–1)
BILIRUBIN URINE: NEGATIVE MG/DL
BLOOD, URINE: NEGATIVE
BUN BLDV-MCNC: 6 MG/DL (ref 6–23)
CALCIUM SERPL-MCNC: 9.3 MG/DL (ref 8.3–10.6)
CHLORIDE BLD-SCNC: 101 MMOL/L (ref 99–110)
CLARITY: ABNORMAL
CO2: 25 MMOL/L (ref 21–32)
COLOR: ABNORMAL
CREAT SERPL-MCNC: 0.7 MG/DL (ref 0.6–1.1)
DIFFERENTIAL TYPE: ABNORMAL
EOSINOPHILS ABSOLUTE: 0.5 K/CU MM
EOSINOPHILS RELATIVE PERCENT: 4.3 % (ref 0–3)
GFR AFRICAN AMERICAN: >60 ML/MIN/1.73M2
GFR NON-AFRICAN AMERICAN: >60 ML/MIN/1.73M2
GLUCOSE BLD-MCNC: 85 MG/DL (ref 70–99)
GLUCOSE, URINE: NEGATIVE MG/DL
HCT VFR BLD CALC: 38.9 % (ref 37–47)
HEMOGLOBIN: 12.3 GM/DL (ref 12.5–16)
IMMATURE NEUTROPHIL %: 0.7 % (ref 0–0.43)
KETONES, URINE: NEGATIVE MG/DL
LEUKOCYTE ESTERASE, URINE: ABNORMAL
LYMPHOCYTES ABSOLUTE: 1.7 K/CU MM
LYMPHOCYTES RELATIVE PERCENT: 15.7 % (ref 24–44)
MCH RBC QN AUTO: 25.7 PG (ref 27–31)
MCHC RBC AUTO-ENTMCNC: 31.6 % (ref 32–36)
MCV RBC AUTO: 81.2 FL (ref 78–100)
MONOCYTES ABSOLUTE: 0.7 K/CU MM
MONOCYTES RELATIVE PERCENT: 6.6 % (ref 0–4)
MUCUS: ABNORMAL HPF
NITRITE URINE, QUANTITATIVE: NEGATIVE
NUCLEATED RBC %: 0 %
PDW BLD-RTO: 13.8 % (ref 11.7–14.9)
PH, URINE: 5 (ref 5–8)
PLATELET # BLD: 329 K/CU MM (ref 140–440)
PMV BLD AUTO: 11.1 FL (ref 7.5–11.1)
POTASSIUM SERPL-SCNC: 4.1 MMOL/L (ref 3.5–5.1)
PROTEIN UA: 100 MG/DL
RBC # BLD: 4.79 M/CU MM (ref 4.2–5.4)
RBC URINE: 1 /HPF (ref 0–6)
SEGMENTED NEUTROPHILS ABSOLUTE COUNT: 7.7 K/CU MM
SEGMENTED NEUTROPHILS RELATIVE PERCENT: 72.4 % (ref 36–66)
SODIUM BLD-SCNC: 137 MMOL/L (ref 135–145)
SPECIFIC GRAVITY UA: 1.03 (ref 1–1.03)
SQUAMOUS EPITHELIAL: 1 /HPF
TOTAL IMMATURE NEUTOROPHIL: 0.07 K/CU MM
TOTAL NUCLEATED RBC: 0 K/CU MM
TOTAL PROTEIN: 7.2 GM/DL (ref 6.4–8.2)
TRICHOMONAS: ABNORMAL /HPF
UROBILINOGEN, URINE: NEGATIVE MG/DL (ref 0.2–1)
WBC # BLD: 10.6 K/CU MM (ref 4–10.5)
WBC UA: 2 /HPF (ref 0–5)
YEAST: ABNORMAL /HPF

## 2021-06-03 PROCEDURE — 81001 URINALYSIS AUTO W/SCOPE: CPT

## 2021-06-03 PROCEDURE — 36415 COLL VENOUS BLD VENIPUNCTURE: CPT

## 2021-06-03 PROCEDURE — 80053 COMPREHEN METABOLIC PANEL: CPT

## 2021-06-03 PROCEDURE — 85025 COMPLETE CBC W/AUTO DIFF WBC: CPT

## 2021-06-03 PROCEDURE — 74018 RADEX ABDOMEN 1 VIEW: CPT

## 2021-08-17 ENCOUNTER — OFFICE VISIT (OUTPATIENT)
Dept: OBGYN | Age: 41
End: 2021-08-17

## 2021-08-17 VITALS
SYSTOLIC BLOOD PRESSURE: 155 MMHG | HEIGHT: 66 IN | WEIGHT: 191 LBS | HEART RATE: 63 BPM | BODY MASS INDEX: 30.7 KG/M2 | DIASTOLIC BLOOD PRESSURE: 95 MMHG

## 2021-08-17 DIAGNOSIS — N83.201 RIGHT OVARIAN CYST: ICD-10-CM

## 2021-08-17 DIAGNOSIS — G89.29 CHRONIC FEMALE PELVIC PAIN: Primary | ICD-10-CM

## 2021-08-17 DIAGNOSIS — R10.2 CHRONIC FEMALE PELVIC PAIN: Primary | ICD-10-CM

## 2021-08-17 PROCEDURE — 99214 OFFICE O/P EST MOD 30 MIN: CPT | Performed by: OBSTETRICS & GYNECOLOGY

## 2021-08-17 PROCEDURE — 76830 TRANSVAGINAL US NON-OB: CPT | Performed by: OBSTETRICS & GYNECOLOGY

## 2021-08-17 SDOH — ECONOMIC STABILITY: FOOD INSECURITY: WITHIN THE PAST 12 MONTHS, THE FOOD YOU BOUGHT JUST DIDN'T LAST AND YOU DIDN'T HAVE MONEY TO GET MORE.: NEVER TRUE

## 2021-08-17 SDOH — ECONOMIC STABILITY: FOOD INSECURITY: WITHIN THE PAST 12 MONTHS, YOU WORRIED THAT YOUR FOOD WOULD RUN OUT BEFORE YOU GOT MONEY TO BUY MORE.: NEVER TRUE

## 2021-08-17 ASSESSMENT — ENCOUNTER SYMPTOMS
ALLERGIC/IMMUNOLOGIC NEGATIVE: 1
GASTROINTESTINAL NEGATIVE: 1
EYES NEGATIVE: 1
RESPIRATORY NEGATIVE: 1

## 2021-08-17 ASSESSMENT — PATIENT HEALTH QUESTIONNAIRE - PHQ9
2. FEELING DOWN, DEPRESSED OR HOPELESS: 0
1. LITTLE INTEREST OR PLEASURE IN DOING THINGS: 1
SUM OF ALL RESPONSES TO PHQ QUESTIONS 1-9: 1
SUM OF ALL RESPONSES TO PHQ QUESTIONS 1-9: 1
SUM OF ALL RESPONSES TO PHQ9 QUESTIONS 1 & 2: 1
SUM OF ALL RESPONSES TO PHQ QUESTIONS 1-9: 1

## 2021-08-17 ASSESSMENT — SOCIAL DETERMINANTS OF HEALTH (SDOH): HOW HARD IS IT FOR YOU TO PAY FOR THE VERY BASICS LIKE FOOD, HOUSING, MEDICAL CARE, AND HEATING?: NOT HARD AT ALL

## 2021-08-17 NOTE — PROGRESS NOTES
21    Brayden Pleitez  1980    Chief Complaint   Patient presents with    Pelvic Pain     cramping off and on since [facility] 2021, had u/ . ns    Follow-up        Brayden Pleitez is a 42 y.o. female who presents today for evaluation of dysmenorrhea. The patient has a complaint of pelvic pain. low back/pelvis. It began 6 month(s). She describes it as achy. It is a 8/10 on a pain scale. The patient has   none  Shedenies any UTI signs or symptoms. She denies back or flank pain. There is associated pain with intercourse. Past Medical History:   Diagnosis Date    Asthma     Constipation     Pelvic pain        Past Surgical History:   Procedure Laterality Date    CYSTOSCOPY N/A 2021    CYSTOSCOPY performed by Breanna Rouse MD at [facility], VAGINAL N/A 2021    [address] with bilateral salpingectomy     TUBAL LIGATION         Social History     Tobacco Use    Smoking status: Former Smoker     Quit date: 2012     Years since quittin.6    Smokeless tobacco: Never Used   Vaping Use    Vaping Use: Never used   Substance Use Topics    Alcohol use: No    Drug use: No       Family History   Problem Relation Age of Onset    Other Father         Lupus       Current Outpatient Medications   Medication Sig Dispense Refill    ibuprofen (ADVIL;MOTRIN) 800 MG tablet Take 1 tablet by mouth every 8 hours as needed for Pain 60 tablet 1    albuterol sulfate  (90 BASE) MCG/ACT inhaler Inhale 2 puffs into the lungs every 4 hours as needed for Wheezing       No current facility-administered medications for this visit. No Known Allergies    No obstetric history on file. There is no immunization history on file for this patient. Review of Systems   Constitutional: Negative. Eyes: Negative. Respiratory: Negative. Cardiovascular: Negative. Gastrointestinal: Negative. Endocrine: Negative.     Genitourinary: Positive for dyspareunia and pelvic pain. Musculoskeletal: Negative. Skin: Negative. Allergic/Immunologic: Negative. Neurological: Negative. Hematological: Negative. Psychiatric/Behavioral: Negative. BP (!) 155/95   Pulse 63   Ht 5' 6\" (1.676 m)   Wt 191 lb (86.6 kg)   BMI 30.83 kg/m²     Physical Exam  Constitutional:       General: She is not in acute distress. Appearance: Normal appearance. She is not ill-appearing. HENT:      Head: Normocephalic. Nose: Nose normal.   Eyes:      General: No scleral icterus. Right eye: No discharge. Left eye: No discharge. Cardiovascular:      Pulses: Normal pulses. Pulmonary:      Effort: Pulmonary effort is normal.   Abdominal:      General: Abdomen is flat. There is no distension. Palpations: Abdomen is soft. There is no mass. Tenderness: There is no abdominal tenderness. Hernia: No hernia is present. Musculoskeletal:         General: Normal range of motion. Cervical back: Normal range of motion and neck supple. No rigidity. Skin:     General: Skin is warm and dry. Neurological:      General: No focal deficit present. Mental Status: She is alert and oriented to person, place, and time. Psychiatric:         Mood and Affect: Mood normal.         Behavior: Behavior normal.         No results found for this visit on 08/17/21. ASSESSMENT AND PLAN   Diagnosis Orders   1. Chronic female pelvic pain     2. Right ovarian cyst       Reviewed ultrasound, cbc, vaginitis panel  Option of laparoscopy, ocps, repeat us in 6 weeks. She desires repeat ultrasound in 6 weeks. Follow up sooner if pain worsens  No follow-ups on file.     Rei Mcbride MD

## 2021-08-25 ENCOUNTER — TELEPHONE (OUTPATIENT)
Dept: OBGYN | Age: 41
End: 2021-08-25

## 2021-08-25 RX ORDER — KETOROLAC TROMETHAMINE 10 MG/1
10 TABLET, FILM COATED ORAL EVERY 6 HOURS PRN
Qty: 20 TABLET | Refills: 0 | Status: ON HOLD | OUTPATIENT
Start: 2021-08-25 | End: 2021-09-22 | Stop reason: HOSPADM

## 2021-08-25 NOTE — TELEPHONE ENCOUNTER
Erx sent for toradol. I would consider laparoscopy versus follow up ov.   If she desires laparoscopy, forward to Geni Kohler

## 2021-08-25 NOTE — TELEPHONE ENCOUNTER
Had [address] last week, cyst on ovary.   Pt states the pain started this am, please advise

## 2021-09-16 ENCOUNTER — OFFICE VISIT (OUTPATIENT)
Dept: OBGYN | Age: 41
End: 2021-09-16

## 2021-09-16 VITALS
HEIGHT: 66 IN | DIASTOLIC BLOOD PRESSURE: 96 MMHG | WEIGHT: 194 LBS | SYSTOLIC BLOOD PRESSURE: 149 MMHG | BODY MASS INDEX: 31.18 KG/M2

## 2021-09-16 DIAGNOSIS — Z01.818 PRE-OP TESTING: Primary | ICD-10-CM

## 2021-09-16 DIAGNOSIS — N94.10 DYSPAREUNIA, FEMALE: ICD-10-CM

## 2021-09-16 DIAGNOSIS — G89.29 CHRONIC FEMALE PELVIC PAIN: ICD-10-CM

## 2021-09-16 DIAGNOSIS — R10.2 CHRONIC FEMALE PELVIC PAIN: ICD-10-CM

## 2021-09-16 PROCEDURE — 99213 OFFICE O/P EST LOW 20 MIN: CPT | Performed by: OBSTETRICS & GYNECOLOGY

## 2021-09-16 NOTE — PROGRESS NOTES
21    Jose Mauro  1980    Chief Complaint   Patient presents with    Pre-op Exam     Laparoscopy due to pelvic pain        Jose Mauro is a 42 y.o. female, No obstetric history on file. , premenopausal complaining of pelvic pain. The pain began suddenly 4 months ago and has been progressively worsening. She states the pain is: Location: in the lower abdomen without radiation  Quality: aching  Quantity: 7/10 in intensity  Chronicity: Onset 4 months ago, gradually worsening  Aggravating factors: sex    Alleviating factors: none  Associated symptoms: none.   She has not been evaluated for pelvic pain in the past.    Past Medical History:   Diagnosis Date    Asthma     Constipation     Pelvic pain        Past Surgical History:   Procedure Laterality Date    CYSTOSCOPY N/A 2021    CYSTOSCOPY performed by Tereso Manuel MD at [facility], VAGINAL N/A 2021    [facility]. [facility]ie James with bilateral salpingectomy     TUBAL LIGATION         Social History     Socioeconomic History    Marital status: Single     Spouse name: Not on file    Number of children: Not on file    Years of education: Not on file    Highest education level: Not on file   Occupational History    Not on file   Tobacco Use    Smoking status: Former Smoker     Quit date: 2012     Years since quittin.7    Smokeless tobacco: Never Used   Vaping Use    Vaping Use: Never used   Substance and Sexual Activity    Alcohol use: No    Drug use: No    Sexual activity: Not on file   Other Topics Concern    Not on file   Social History Narrative    Not on file     Social Determinants of Health     Financial Resource Strain: Low Risk     Difficulty of Paying Living Expenses: Not hard at all   Food Insecurity: No Food Insecurity    Worried About [facility] in the Last Year: Never true    Thor of Food in the Last Year: Never true   Transportation Needs:     Lack of Transportation (Medical):  Lack of Transportation (Non-Medical):    Physical Activity:     Days of Exercise per Week:     Minutes of Exercise per Session:    Stress:     Feeling of Stress :    Social Connections:     Frequency of Communication with Friends and Family:     Frequency of Social Gatherings with Friends and Family:     Attends Mandaeism Services:     Active Member of Clubs or Organizations:     Attends Club or Organization Meetings:     Marital Status:    Intimate Partner Violence:     Fear of Current or Ex-Partner:     Emotionally Abused:     Physically Abused:     Sexually Abused:        Family History   Problem Relation Age of Onset    Other Father         Lupus       Current Outpatient Medications   Medication Sig Dispense Refill    ketorolac (TORADOL) 10 MG tablet Take 1 tablet by mouth every 6 hours as needed for Pain 20 tablet 0    albuterol sulfate  (90 BASE) MCG/ACT inhaler Inhale 2 puffs into the lungs every 4 hours as needed for Wheezing       No current facility-administered medications for this visit. No Known Allergies    No obstetric history on file. There is no immunization history on file for this patient.     Review of Systems    BP (!) 149/96   Ht 5' 6\" (1.676 m)   Wt 194 lb (88 kg)   BMI 31.31 kg/m²     Physical Exam    No results found for this visit on 09/16/21.  06/03/2021 1152 06/03/2021 1442 Urinalysis [[id number]]   (Abnormal)    Component Value Units   Color, UA AMBERAbnormal     Clarity, UA CLOUDYAbnormal     Glucose, Urine NEGATIVE MG/DL   Bilirubin Urine NEGATIVE MG/DL   Ketones, Urine NEGATIVE MG/DL   Specific Gravity, UA 1.032     Blood, Urine NEGATIVE    pH, Urine 5.0    Protein, UA 100Abnormal  MG/DL   Urobilinogen, Urine NEGATIVE MG/DL   Nitrite Urine, Quantitative NEGATIVE    Leukocyte Esterase, Urine MODERATEAbnormal     RBC, UA 1 /HPF   WBC, UA 2 /HPF   Bacteria, UA OCCASIONALAbnormal  /HPF   Yeast, UA RARE /HPF   Squam Epithel, UA 1 /HPF   Mucus, UA FEWAbnormal  HPF   Trichomonas, UA NONE SEEN /HPF           06/03/2021 1152 06/03/2021 1606 Comprehensive Metabolic Panel [[id number]]   (Abnormal)    Component Value Units   Sodium 137 MMOL/L   Potassium 4.1 MMOL/L   Chloride 101 mMol/L   CO2 25 MMOL/L   BUN 6 MG/DL   CREATININE 0.7 MG/DL   Glucose 85 MG/DL   Calcium 9.3 MG/DL   Albumin 4.5 GM/DL   Total Protein 7.2 GM/DL   Total Bilirubin 0.4 MG/DL   ALT 123High  U/L   AST 54High  IU/L   Alkaline Phosphatase 205High  IU/L   GFR Non-African American >60 mL/min/1.73m2   GFR African American >60 mL/min/1.73m2   Anion Gap 11            06/03/2021 1152 06/03/2021 1427 CBC Auto Differential [[id number]]   (Abnormal)    Component Value Units   WBC 10. 6High  K/CU MM   RBC 4.79 M/CU MM   Hemoglobin 12.3Low  GM/DL   Hematocrit 38.9 %   MCV 81.2 FL   MCH 25.7Low  PG   MCHC 31.6Low  %   RDW 13.8 %   Platelets 252 K/CU MM   MPV 11.1 FL   Differential Type AUTOMATED DIFFERENTIAL    Segs Relative 72. 4High  %   Lymphocytes % 15.7Low  %   Monocytes % 6.6High  %   Eosinophils % 4.3High  %   Basophils % 0.3 %   Segs Absolute 7.7 K/CU MM   Lymphocytes Absolute 1.7 K/CU MM   Monocytes Absolute 0.7 K/CU MM   Eosinophils Absolute 0.5 K/CU MM   Basophils Absolute 0.0 K/CU MM   Nucleated RBC % 0.0 %   Total Nucleated RBC 0.0 K/CU MM   Total Immature Neutrophil 0.07 K/CU MM   Immature Neutrophil % 0.7High  %               ASSESSMENT AND PLAN    1. Chronic female pelvic pain  Discussed options of hormonal management versus laparosocpy, desires laparoscopy    2. Dyspareunia, female      3. Pre-op testing    - Covid-19 Ambulatory  Return in about 2 weeks (around 9/30/2021).     Stephanie Sandhu MD

## 2021-09-17 LAB — SARS-COV-2: NOT DETECTED

## 2021-09-20 NOTE — PROGRESS NOTES
Surgery    you will be called      with times               1. Do not eat or drink anything after midnight - unless instructed by your doctor prior to surgery. This includes                   no water, chewing gum or mints. 2. Follow your directions as prescribed by the doctor for your procedure and medications. 3. Check with your Doctor regarding stopping Plavix, Coumadin, Lovenox,Effient,Pradaxa,Xarelto, Fragmin or other blood thinners and                   follow their instructions. 4. Do not smoke, and do not drink any alcoholic beverages 24 hours prior to surgery. This includes NA Beer. 5. You may brush your teeth and gargle the morning of surgery. DO NOT SWALLOW WATER   6. You MUST make arrangements for a responsible adult to take you home after your surgery and be able to check on you every couple                   hours for the day. You will not be allowed to leave alone or drive yourself home. It is strongly suggested someone stay with you the first 24                   hrs. Your surgery will be cancelled if you do not have a ride home. 7. Please wear simple, loose fitting clothing to the hospital.  Artis Acevedo not bring valuables (money, credit cards, checkbooks, etc.) Do not wear any                   makeup (including no eye makeup) or nail polish on your fingers or toes. 8. DO NOT wear any jewelry or piercings on day of surgery. All body piercing jewelry must be removed. 9. If you have dentures, they will be removed before going to the OR; we will provide you a container. If you wear contact lenses or glasses,                  they will be removed; please bring a case for them. 10. If you  have a Living Will and Durable Power of  for Healthcare, please bring in a copy. 11. Please bring picture ID,  insurance card, paperwork from the doctors office    (H & P, Consent, & card for implantable devices).            12. Take a shower the night before or morning of your procedure, do not apply any lotion, oil or powder. 13. Wear a mask covering your nose & mouth when entering the hospital. Have your covid-19 test performed within 2-7 days of your                  surgery. Quarantine yourself after the test until after your surgery.

## 2021-09-20 NOTE — PROGRESS NOTES
night before or morning of your procedure, do not apply any lotion, oil or powder. 13. Wear a mask covering your nose & mouth when entering the hospital. Have your covid-19 test performed within 2-7 days of your                  surgery. Quarantine yourself after the test until after your surgery.

## 2021-09-21 ENCOUNTER — ANESTHESIA EVENT (OUTPATIENT)
Dept: OPERATING ROOM | Age: 41
End: 2021-09-21

## 2021-09-21 NOTE — ANESTHESIA PRE PROCEDURE
Department of Anesthesiology  Preprocedure Note       Name:  Zell Crigler   Age:  42 y.o.  :  1980                                          MRN:  [id number]         Date:  2021      Surgeon: Renate Champion):  [address], [place]    Procedure: Procedure(s):  LAPAROSCOPY EXPLORATORY    Medications prior to admission:   Prior to Admission medications    Medication Sig Start Date End Date Taking? Authorizing Provider   ketorolac (TORADOL) 10 MG tablet Take 1 tablet by mouth every 6 hours as needed for Pain 21  [address], [place]   albuterol sulfate  (90 BASE) MCG/ACT inhaler Inhale 2 puffs into the lungs every 4 hours as needed for Wheezing    Historical Provider, MD       Current medications:    No current facility-administered medications for this encounter.      Current Outpatient Medications   Medication Sig Dispense Refill    ketorolac (TORADOL) 10 MG tablet Take 1 tablet by mouth every 6 hours as needed for Pain 20 tablet 0    albuterol sulfate  (90 BASE) MCG/ACT inhaler Inhale 2 puffs into the lungs every 4 hours as needed for Wheezing         Allergies:  No Known Allergies    Problem List:    Patient Active Problem List   Diagnosis Code    Thyroid nodule E04.1    Menorrhalgia N94.6    Chronic female pelvic pain R10.2, G89.29       Past Medical History:        Diagnosis Date    Asthma     Constipation     Kidney stone     Pelvic pain        Past Surgical History:        Procedure Laterality Date    CYSTOSCOPY N/A 2021    CYSTOSCOPY performed by Vik Hutchison MD at [address] [place] [address], VAGINAL N/A 2021    [address] with bilateral [address]pingectomy     TUBAL LIGATION         Social History:    Social History     Tobacco Use    Smoking status: Former Smoker     Quit date: 2012     Years since quittin.7    Smokeless tobacco: Never Used   Substance Use Topics    Alcohol use: No                                Counseling given: Not Answered      Vital Signs (Current):   Vitals:    09/20/21 1157   Weight: 194 lb (88 kg)   Height: 5' 6.5\" (1.689 m)                                              BP Readings from Last 3 Encounters:   09/16/21 (!) 149/96   08/17/21 (!) 155/95   02/17/21 136/88       NPO Status:                                                                                 BMI:   Wt Readings from Last 3 Encounters:   09/16/21 194 lb (88 kg)   08/17/21 191 lb (86.6 kg)   02/16/21 190 lb (86.2 kg)     Body mass index is 30.84 kg/m². CBC:   Lab Results   Component Value Date    WBC 10.6 06/03/2021    RBC 4.79 06/03/2021    HGB 12.3 06/03/2021    HCT 38.9 06/03/2021    MCV 81.2 06/03/2021    RDW 13.8 06/03/2021     06/03/2021       CMP:   Lab Results   Component Value Date     06/03/2021    K 4.1 06/03/2021     06/03/2021    CO2 25 06/03/2021    BUN 6 06/03/2021    CREATININE 0.7 06/03/2021    GFRAA >60 06/03/2021    LABGLOM >60 06/03/2021    GLUCOSE 85 06/03/2021    PROT 7.2 06/03/2021    PROT 7.1 07/27/2012    CALCIUM 9.3 06/03/2021    BILITOT 0.4 06/03/2021    ALKPHOS 205 06/03/2021    AST 54 06/03/2021     06/03/2021       POC Tests: No results for input(s): POCGLU, POCNA, POCK, POCCL, POCBUN, POCHEMO, POCHCT in the last 72 hours.     Coags: No results found for: PROTIME, INR, APTT    HCG (If Applicable):   Lab Results   Component Value Date    PREGTESTUR NEGATIVE 02/16/2021        ABGs: No results found for: PHART, PO2ART, NVA5BDJ, UOT5LJM, BEART, B6YIQAXQ     Type & Screen (If Applicable):  No results found for: LABABO, LABRH    Drug/Infectious Status (If Applicable):  No results found for: HIV, HEPCAB    COVID-19 Screening (If Applicable):   Lab Results   Component Value Date    COVID19 Not Detected 09/16/2021           Anesthesia Evaluation  Patient summary reviewed  Airway: Mallampati: III  TM distance: >3 FB   Neck ROM: full  Mouth opening: > = 3 FB Dental:          Pulmonary: breath sounds clear to auscultation  (+) asthma:                            Cardiovascular:Negative CV ROS            Rhythm: regular                      Neuro/Psych:   Negative Neuro/Psych ROS              GI/Hepatic/Renal: Neg GI/Hepatic/Renal ROS  (+) morbid obesity          Endo/Other: Negative Endo/Other ROS                    Abdominal:   (+) obese,           Vascular: Other Findings:           Anesthesia Plan      general     ASA 2       Induction: intravenous. MIPS: Postoperative opioids intended and Prophylactic antiemetics administered. Anesthetic plan and risks discussed with patient. Plan discussed with CRNA.     Attending anesthesiologist reviewed and agrees with Preprocedure content            BRAXTON Thakkar - CRNA   9/21/2021

## 2021-09-22 ENCOUNTER — ANESTHESIA (OUTPATIENT)
Dept: OPERATING ROOM | Age: 41
End: 2021-09-22

## 2021-09-22 ENCOUNTER — HOSPITAL ENCOUNTER (OUTPATIENT)
Age: 41
Setting detail: OUTPATIENT SURGERY
Discharge: HOME | End: 2021-09-22
Attending: OBSTETRICS & GYNECOLOGY | Admitting: OBSTETRICS & GYNECOLOGY

## 2021-09-22 VITALS
HEART RATE: 84 BPM | SYSTOLIC BLOOD PRESSURE: 138 MMHG | DIASTOLIC BLOOD PRESSURE: 85 MMHG | TEMPERATURE: 97.8 F | WEIGHT: 194 LBS | OXYGEN SATURATION: 97 % | RESPIRATION RATE: 16 BRPM | HEIGHT: 67 IN | BODY MASS INDEX: 30.45 KG/M2

## 2021-09-22 VITALS
DIASTOLIC BLOOD PRESSURE: 81 MMHG | RESPIRATION RATE: 18 BRPM | TEMPERATURE: 96.7 F | OXYGEN SATURATION: 100 % | SYSTOLIC BLOOD PRESSURE: 121 MMHG

## 2021-09-22 DIAGNOSIS — R10.2 PAIN PELVIC: ICD-10-CM

## 2021-09-22 DIAGNOSIS — G89.18 POST-OP PAIN: Primary | ICD-10-CM

## 2021-09-22 PROBLEM — N73.6 PELVIC ADHESIONS: Status: ACTIVE | Noted: 2021-09-22

## 2021-09-22 PROBLEM — K66.0 ADHESION OF INTESTINE: Status: ACTIVE | Noted: 2021-09-22

## 2021-09-22 PROBLEM — N83.201 RIGHT OVARIAN CYST: Status: ACTIVE | Noted: 2021-09-22

## 2021-09-22 LAB
BASOPHILS ABSOLUTE: 0 K/CU MM
BASOPHILS RELATIVE PERCENT: 0.5 % (ref 0–1)
DIFFERENTIAL TYPE: ABNORMAL
EOSINOPHILS ABSOLUTE: 0.2 K/CU MM
EOSINOPHILS RELATIVE PERCENT: 1.9 % (ref 0–3)
HCT VFR BLD CALC: 39.1 % (ref 37–47)
HEMOGLOBIN: 12.6 GM/DL (ref 12.5–16)
IMMATURE NEUTROPHIL %: 0.5 % (ref 0–0.43)
LYMPHOCYTES ABSOLUTE: 1.9 K/CU MM
LYMPHOCYTES RELATIVE PERCENT: 21.7 % (ref 24–44)
MCH RBC QN AUTO: 26.3 PG (ref 27–31)
MCHC RBC AUTO-ENTMCNC: 32.2 % (ref 32–36)
MCV RBC AUTO: 81.6 FL (ref 78–100)
MONOCYTES ABSOLUTE: 0.6 K/CU MM
MONOCYTES RELATIVE PERCENT: 6.5 % (ref 0–4)
NUCLEATED RBC %: 0 %
PDW BLD-RTO: 14.6 % (ref 11.7–14.9)
PLATELET # BLD: 254 K/CU MM (ref 140–440)
PMV BLD AUTO: 10.3 FL (ref 7.5–11.1)
RBC # BLD: 4.79 M/CU MM (ref 4.2–5.4)
SEGMENTED NEUTROPHILS ABSOLUTE COUNT: 5.9 K/CU MM
SEGMENTED NEUTROPHILS RELATIVE PERCENT: 68.9 % (ref 36–66)
TOTAL IMMATURE NEUTOROPHIL: 0.04 K/CU MM
TOTAL NUCLEATED RBC: 0 K/CU MM
WBC # BLD: 8.6 K/CU MM (ref 4–10.5)

## 2021-09-22 PROCEDURE — 2500000003 HC RX 250 WO HCPCS: Performed by: NURSE ANESTHETIST, CERTIFIED REGISTERED

## 2021-09-22 PROCEDURE — 2720000010 HC SURG SUPPLY STERILE: Performed by: OBSTETRICS & GYNECOLOGY

## 2021-09-22 PROCEDURE — 6370000000 HC RX 637 (ALT 250 FOR IP): Performed by: OBSTETRICS & GYNECOLOGY

## 2021-09-22 PROCEDURE — 2580000003 HC RX 258: Performed by: ANESTHESIOLOGY

## 2021-09-22 PROCEDURE — 3600000004 HC SURGERY LEVEL 4 BASE: Performed by: OBSTETRICS & GYNECOLOGY

## 2021-09-22 PROCEDURE — 7100000000 HC PACU RECOVERY - FIRST 15 MIN: Performed by: OBSTETRICS & GYNECOLOGY

## 2021-09-22 PROCEDURE — 3600000014 HC SURGERY LEVEL 4 ADDTL 15MIN: Performed by: OBSTETRICS & GYNECOLOGY

## 2021-09-22 PROCEDURE — 7100000011 HC PHASE II RECOVERY - ADDTL 15 MIN: Performed by: OBSTETRICS & GYNECOLOGY

## 2021-09-22 PROCEDURE — 58661 LAPAROSCOPY REMOVE ADNEXA: CPT | Performed by: OBSTETRICS & GYNECOLOGY

## 2021-09-22 PROCEDURE — 7100000010 HC PHASE II RECOVERY - FIRST 15 MIN: Performed by: OBSTETRICS & GYNECOLOGY

## 2021-09-22 PROCEDURE — 3700000000 HC ANESTHESIA ATTENDED CARE: Performed by: OBSTETRICS & GYNECOLOGY

## 2021-09-22 PROCEDURE — 88305 TISSUE EXAM BY PATHOLOGIST: CPT | Performed by: PATHOLOGY

## 2021-09-22 PROCEDURE — 3700000001 HC ADD 15 MINUTES (ANESTHESIA): Performed by: OBSTETRICS & GYNECOLOGY

## 2021-09-22 PROCEDURE — 2500000003 HC RX 250 WO HCPCS: Performed by: OBSTETRICS & GYNECOLOGY

## 2021-09-22 PROCEDURE — 6360000002 HC RX W HCPCS: Performed by: ANESTHESIOLOGY

## 2021-09-22 PROCEDURE — 6360000002 HC RX W HCPCS: Performed by: NURSE ANESTHETIST, CERTIFIED REGISTERED

## 2021-09-22 PROCEDURE — 85025 COMPLETE CBC W/AUTO DIFF WBC: CPT

## 2021-09-22 PROCEDURE — 2709999900 HC NON-CHARGEABLE SUPPLY: Performed by: OBSTETRICS & GYNECOLOGY

## 2021-09-22 PROCEDURE — 7100000001 HC PACU RECOVERY - ADDTL 15 MIN: Performed by: OBSTETRICS & GYNECOLOGY

## 2021-09-22 RX ORDER — AMOXICILLIN AND CLAVULANATE POTASSIUM 875; 125 MG/1; MG/1
1 TABLET, FILM COATED ORAL 2 TIMES DAILY WITH MEALS
Qty: 20 TABLET | Refills: 0 | Status: SHIPPED | OUTPATIENT
Start: 2021-09-22 | End: 2021-10-02

## 2021-09-22 RX ORDER — FENTANYL CITRATE 50 UG/ML
50 INJECTION, SOLUTION INTRAMUSCULAR; INTRAVENOUS EVERY 5 MIN PRN
Status: DISCONTINUED | OUTPATIENT
Start: 2021-09-22 | End: 2021-09-22 | Stop reason: HOSPADM

## 2021-09-22 RX ORDER — PROPOFOL 10 MG/ML
INJECTION, EMULSION INTRAVENOUS PRN
Status: DISCONTINUED | OUTPATIENT
Start: 2021-09-22 | End: 2021-09-22 | Stop reason: SDUPTHER

## 2021-09-22 RX ORDER — KETOROLAC TROMETHAMINE 30 MG/ML
INJECTION, SOLUTION INTRAMUSCULAR; INTRAVENOUS PRN
Status: DISCONTINUED | OUTPATIENT
Start: 2021-09-22 | End: 2021-09-22 | Stop reason: SDUPTHER

## 2021-09-22 RX ORDER — HYDRALAZINE HYDROCHLORIDE 20 MG/ML
5 INJECTION INTRAMUSCULAR; INTRAVENOUS EVERY 10 MIN PRN
Status: DISCONTINUED | OUTPATIENT
Start: 2021-09-22 | End: 2021-09-22 | Stop reason: HOSPADM

## 2021-09-22 RX ORDER — DEXAMETHASONE SODIUM PHOSPHATE 4 MG/ML
INJECTION, SOLUTION INTRA-ARTICULAR; INTRALESIONAL; INTRAMUSCULAR; INTRAVENOUS; SOFT TISSUE PRN
Status: DISCONTINUED | OUTPATIENT
Start: 2021-09-22 | End: 2021-09-22 | Stop reason: SDUPTHER

## 2021-09-22 RX ORDER — ACETAMINOPHEN 500 MG
1000 TABLET ORAL ONCE
Status: COMPLETED | OUTPATIENT
Start: 2021-09-22 | End: 2021-09-22

## 2021-09-22 RX ORDER — FENTANYL CITRATE 50 UG/ML
INJECTION, SOLUTION INTRAMUSCULAR; INTRAVENOUS PRN
Status: DISCONTINUED | OUTPATIENT
Start: 2021-09-22 | End: 2021-09-22 | Stop reason: SDUPTHER

## 2021-09-22 RX ORDER — ONDANSETRON 2 MG/ML
4 INJECTION INTRAMUSCULAR; INTRAVENOUS
Status: DISCONTINUED | OUTPATIENT
Start: 2021-09-22 | End: 2021-09-22 | Stop reason: HOSPADM

## 2021-09-22 RX ORDER — SUCCINYLCHOLINE/SOD CL,ISO/PF 100 MG/5ML
SYRINGE (ML) INTRAVENOUS PRN
Status: DISCONTINUED | OUTPATIENT
Start: 2021-09-22 | End: 2021-09-22 | Stop reason: SDUPTHER

## 2021-09-22 RX ORDER — ROCURONIUM BROMIDE 10 MG/ML
INJECTION, SOLUTION INTRAVENOUS PRN
Status: DISCONTINUED | OUTPATIENT
Start: 2021-09-22 | End: 2021-09-22 | Stop reason: SDUPTHER

## 2021-09-22 RX ORDER — FENTANYL CITRATE 50 UG/ML
25 INJECTION, SOLUTION INTRAMUSCULAR; INTRAVENOUS EVERY 5 MIN PRN
Status: DISCONTINUED | OUTPATIENT
Start: 2021-09-22 | End: 2021-09-22 | Stop reason: HOSPADM

## 2021-09-22 RX ORDER — MIDAZOLAM HYDROCHLORIDE 1 MG/ML
INJECTION INTRAMUSCULAR; INTRAVENOUS PRN
Status: DISCONTINUED | OUTPATIENT
Start: 2021-09-22 | End: 2021-09-22 | Stop reason: SDUPTHER

## 2021-09-22 RX ORDER — NEOSTIGMINE METHYLSULFATE 5 MG/5 ML
SYRINGE (ML) INTRAVENOUS PRN
Status: DISCONTINUED | OUTPATIENT
Start: 2021-09-22 | End: 2021-09-22 | Stop reason: SDUPTHER

## 2021-09-22 RX ORDER — LIDOCAINE HYDROCHLORIDE 20 MG/ML
INJECTION, SOLUTION INTRAVENOUS PRN
Status: DISCONTINUED | OUTPATIENT
Start: 2021-09-22 | End: 2021-09-22 | Stop reason: SDUPTHER

## 2021-09-22 RX ORDER — LABETALOL HYDROCHLORIDE 5 MG/ML
5 INJECTION, SOLUTION INTRAVENOUS EVERY 10 MIN PRN
Status: DISCONTINUED | OUTPATIENT
Start: 2021-09-22 | End: 2021-09-22 | Stop reason: HOSPADM

## 2021-09-22 RX ORDER — GABAPENTIN 300 MG/1
600 CAPSULE ORAL ONCE
Status: COMPLETED | OUTPATIENT
Start: 2021-09-22 | End: 2021-09-22

## 2021-09-22 RX ORDER — HYDROCODONE BITARTRATE AND ACETAMINOPHEN 5; 325 MG/1; MG/1
1 TABLET ORAL EVERY 6 HOURS PRN
Qty: 20 TABLET | Refills: 0 | Status: SHIPPED | OUTPATIENT
Start: 2021-09-22 | End: 2021-09-27

## 2021-09-22 RX ORDER — BUPIVACAINE HYDROCHLORIDE AND EPINEPHRINE 5; 5 MG/ML; UG/ML
INJECTION, SOLUTION EPIDURAL; INTRACAUDAL; PERINEURAL
Status: COMPLETED | OUTPATIENT
Start: 2021-09-22 | End: 2021-09-22

## 2021-09-22 RX ORDER — IBUPROFEN 800 MG/1
800 TABLET ORAL EVERY 8 HOURS PRN
Qty: 60 TABLET | Refills: 2 | Status: SHIPPED | OUTPATIENT
Start: 2021-09-22

## 2021-09-22 RX ORDER — ONDANSETRON 2 MG/ML
INJECTION INTRAMUSCULAR; INTRAVENOUS PRN
Status: DISCONTINUED | OUTPATIENT
Start: 2021-09-22 | End: 2021-09-22 | Stop reason: SDUPTHER

## 2021-09-22 RX ORDER — CELECOXIB 200 MG/1
200 CAPSULE ORAL ONCE
Status: COMPLETED | OUTPATIENT
Start: 2021-09-22 | End: 2021-09-22

## 2021-09-22 RX ORDER — SODIUM CHLORIDE, SODIUM LACTATE, POTASSIUM CHLORIDE, CALCIUM CHLORIDE 600; 310; 30; 20 MG/100ML; MG/100ML; MG/100ML; MG/100ML
INJECTION, SOLUTION INTRAVENOUS CONTINUOUS
Status: DISCONTINUED | OUTPATIENT
Start: 2021-09-22 | End: 2021-09-22 | Stop reason: HOSPADM

## 2021-09-22 RX ORDER — GLYCOPYRROLATE 1 MG/5 ML
SYRINGE (ML) INTRAVENOUS PRN
Status: DISCONTINUED | OUTPATIENT
Start: 2021-09-22 | End: 2021-09-22 | Stop reason: SDUPTHER

## 2021-09-22 RX ADMIN — ROCURONIUM BROMIDE 10 MG: 10 INJECTION INTRAVENOUS at 11:12

## 2021-09-22 RX ADMIN — FENTANYL CITRATE 50 MCG: 50 INJECTION INTRAMUSCULAR; INTRAVENOUS at 12:59

## 2021-09-22 RX ADMIN — PROPOFOL 150 MG: 10 INJECTION, EMULSION INTRAVENOUS at 10:50

## 2021-09-22 RX ADMIN — CELECOXIB 200 MG: 200 CAPSULE ORAL at 08:31

## 2021-09-22 RX ADMIN — GABAPENTIN 600 MG: 300 CAPSULE ORAL at 08:31

## 2021-09-22 RX ADMIN — ONDANSETRON 4 MG: 2 INJECTION INTRAMUSCULAR; INTRAVENOUS at 11:00

## 2021-09-22 RX ADMIN — MIDAZOLAM 2 MG: 1 INJECTION INTRAMUSCULAR; INTRAVENOUS at 10:44

## 2021-09-22 RX ADMIN — KETOROLAC TROMETHAMINE 30 MG: 30 INJECTION, SOLUTION INTRAMUSCULAR; INTRAVENOUS at 12:04

## 2021-09-22 RX ADMIN — FENTANYL CITRATE 100 MCG: 50 INJECTION, SOLUTION INTRAMUSCULAR; INTRAVENOUS at 10:50

## 2021-09-22 RX ADMIN — FENTANYL CITRATE 25 MCG: 50 INJECTION INTRAMUSCULAR; INTRAVENOUS at 13:15

## 2021-09-22 RX ADMIN — ACETAMINOPHEN 1000 MG: 500 TABLET ORAL at 08:31

## 2021-09-22 RX ADMIN — SODIUM CHLORIDE, POTASSIUM CHLORIDE, SODIUM LACTATE AND CALCIUM CHLORIDE: 600; 310; 30; 20 INJECTION, SOLUTION INTRAVENOUS at 08:40

## 2021-09-22 RX ADMIN — ROCURONIUM BROMIDE 30 MG: 10 INJECTION INTRAVENOUS at 11:00

## 2021-09-22 RX ADMIN — FENTANYL CITRATE 50 MCG: 50 INJECTION INTRAMUSCULAR; INTRAVENOUS at 12:41

## 2021-09-22 RX ADMIN — Medication 100 MG: at 10:50

## 2021-09-22 RX ADMIN — Medication 3 MG: at 11:57

## 2021-09-22 RX ADMIN — SODIUM CHLORIDE, POTASSIUM CHLORIDE, SODIUM LACTATE AND CALCIUM CHLORIDE: 600; 310; 30; 20 INJECTION, SOLUTION INTRAVENOUS at 10:45

## 2021-09-22 RX ADMIN — LIDOCAINE HYDROCHLORIDE 100 MG: 20 INJECTION, SOLUTION INTRAVENOUS at 10:50

## 2021-09-22 RX ADMIN — Medication 0.6 MG: at 11:57

## 2021-09-22 RX ADMIN — DEXAMETHASONE SODIUM PHOSPHATE 8 MG: 4 INJECTION, SOLUTION INTRAMUSCULAR; INTRAVENOUS at 11:00

## 2021-09-22 ASSESSMENT — PULMONARY FUNCTION TESTS
PIF_VALUE: 27
PIF_VALUE: 29
PIF_VALUE: 29
PIF_VALUE: 26
PIF_VALUE: 2
PIF_VALUE: 30
PIF_VALUE: 17
PIF_VALUE: 26
PIF_VALUE: 17
PIF_VALUE: 0
PIF_VALUE: 17
PIF_VALUE: 26
PIF_VALUE: 29
PIF_VALUE: 3
PIF_VALUE: 29
PIF_VALUE: 18
PIF_VALUE: 17
PIF_VALUE: 7
PIF_VALUE: 2
PIF_VALUE: 28
PIF_VALUE: 27
PIF_VALUE: 29
PIF_VALUE: 31
PIF_VALUE: 28
PIF_VALUE: 27
PIF_VALUE: 28
PIF_VALUE: 29
PIF_VALUE: 18
PIF_VALUE: 29
PIF_VALUE: 25
PIF_VALUE: 28
PIF_VALUE: 29
PIF_VALUE: 29
PIF_VALUE: 17
PIF_VALUE: 28
PIF_VALUE: 29
PIF_VALUE: 19
PIF_VALUE: 26
PIF_VALUE: 0
PIF_VALUE: 27
PIF_VALUE: 27
PIF_VALUE: 17
PIF_VALUE: 29
PIF_VALUE: 28
PIF_VALUE: 22
PIF_VALUE: 4
PIF_VALUE: 17
PIF_VALUE: 5
PIF_VALUE: 18
PIF_VALUE: 6
PIF_VALUE: 18
PIF_VALUE: 20
PIF_VALUE: 1
PIF_VALUE: 17
PIF_VALUE: 18
PIF_VALUE: 17
PIF_VALUE: 17
PIF_VALUE: 0
PIF_VALUE: 17
PIF_VALUE: 29
PIF_VALUE: 27
PIF_VALUE: 17
PIF_VALUE: 18
PIF_VALUE: 29
PIF_VALUE: 17
PIF_VALUE: 8
PIF_VALUE: 17
PIF_VALUE: 19
PIF_VALUE: 17
PIF_VALUE: 29
PIF_VALUE: 26
PIF_VALUE: 29
PIF_VALUE: 0
PIF_VALUE: 26
PIF_VALUE: 29
PIF_VALUE: 20
PIF_VALUE: 0
PIF_VALUE: 1
PIF_VALUE: 29
PIF_VALUE: 17
PIF_VALUE: 0
PIF_VALUE: 1
PIF_VALUE: 27
PIF_VALUE: 17
PIF_VALUE: 15
PIF_VALUE: 28
PIF_VALUE: 29
PIF_VALUE: 29
PIF_VALUE: 28
PIF_VALUE: 29

## 2021-09-22 ASSESSMENT — PAIN SCALES - GENERAL
PAINLEVEL_OUTOF10: 5
PAINLEVEL_OUTOF10: 7
PAINLEVEL_OUTOF10: 0
PAINLEVEL_OUTOF10: 6
PAINLEVEL_OUTOF10: 6
PAINLEVEL_OUTOF10: 7

## 2021-09-22 ASSESSMENT — PAIN DESCRIPTION - PAIN TYPE
TYPE: SURGICAL PAIN

## 2021-09-22 ASSESSMENT — PAIN DESCRIPTION - PROGRESSION
CLINICAL_PROGRESSION: GRADUALLY IMPROVING
CLINICAL_PROGRESSION: NOT CHANGED

## 2021-09-22 ASSESSMENT — PAIN DESCRIPTION - DESCRIPTORS
DESCRIPTORS: ACHING;DISCOMFORT
DESCRIPTORS: ACHING
DESCRIPTORS: ACHING;DISCOMFORT
DESCRIPTORS: ACHING;DISCOMFORT

## 2021-09-22 ASSESSMENT — PAIN DESCRIPTION - LOCATION
LOCATION: ABDOMEN

## 2021-09-22 ASSESSMENT — PAIN - FUNCTIONAL ASSESSMENT: PAIN_FUNCTIONAL_ASSESSMENT: 0-10

## 2021-09-22 ASSESSMENT — PAIN DESCRIPTION - FREQUENCY
FREQUENCY: CONTINUOUS

## 2021-09-22 ASSESSMENT — PAIN DESCRIPTION - ORIENTATION: ORIENTATION: LEFT;LOWER

## 2021-09-22 NOTE — PROGRESS NOTES
1335  Pt back to Same Day from PACU per cart. Pt is awake and alert--Skin W&D. Report received from [facility] (MANUELA MCKNIGHT). Pt has 2 lap sites to abd covered by large bandaids--no drainage noted. C/o pain to Lt side of abd  6/10. Denies any nausea. Given juice to drink and crackers per request.   Mother at bedside. 46  Pt states is ready to go home. IV dc'd for pt to get dressed. Pt tolerating po intake w/o nausea. Pt and her mother instructed for discharge care and follow-up and use of Rx with understanding voiced. 1435  Pt to car at exit per wheelchair by volunteer.

## 2021-09-22 NOTE — PROGRESS NOTES
1220 - transferred from OR on cart, monitor applied, alarms on and verified, bedside handoff provided by [address] and [address] - medicated for complaint of surgical pain/discomfort  1310 - patient turned, repositioned, linens changed; patient tolerated well  1330 - phase one care complete; transferred to [facility] room 12

## 2021-09-22 NOTE — BRIEF OP NOTE
Department of Gynecology  Brief Operative Report        Pre-operative Diagnosis:  Chronic female pelvic pain    Post-operative Diagnosis:  Same, pelvic adhesions, multicystic right ovarian, transverse colon adhered to anterior abdominal wall    Procedure:  laparoscopy with Right oophorectomy and adhesiolysis, laparoscopy with enterolysis    Surgeon:  Cecilio Garg     Assistant(s):  none    Anesthesia:  General Endotracheal Anesthesia    Findings: pelvic adhesions, multicystic right ovarian, transverse colon adhered to anterior abdominal wall      Estimated blood loss:  5cc    Blood Transfusion?:  No     Drains:  none    Specimens:  Right ovary    Complications:  none    Condition:  Stable    [id number]    See dictated operative report for full details.       Tatiana Lockhart MD 9/22/2021 12:29 PM

## 2021-09-22 NOTE — H&P
Marianne Sherman  1980  Primary Care Physician: Keith Godinez MD    HISTORY & PHYSICAL      2021       HOSPITAL: [facility]    HPI: Marianne Sherman is a 42 y.o. female, No obstetric history on file. , premenopausal complaining of pelvic pain.     The pain began suddenly 4 months ago and has been progressively worsening. She states the pain is: Location: in the lower abdomen without radiation  Quality: aching  Quantity: 7/10 in intensity  Chronicity: Onset 4 months ago, gradually worsening  Aggravating factors: sex     Alleviating factors: none  Associated symptoms: none. She has not been evaluated for pelvic pain in the past.     No relief with NSAIDs. Declines hormones due to side effects and risks. No diagnosis found. Patient Active Problem List   Diagnosis    Thyroid nodule    Menorrhalgia    Chronic female pelvic pain       OB History   No obstetric history on file.      Past Medical History:   Diagnosis Date    Asthma     Constipation     Kidney stone     Pelvic pain        Past Surgical History:   Procedure Laterality Date    CYSTOSCOPY N/A 2021    CYSTOSCOPY performed by Tracy Parker MD at [address] [place] [address], VAGINAL N/A 2021    [address] with bilateral [address]ectomy     TUBAL LIGATION         Social History     Socioeconomic History    Marital status: Single     Spouse name: Not on file    Number of children: Not on file    Years of education: Not on file    Highest education level: Not on file   Occupational History    Not on file   Tobacco Use    Smoking status: Former Smoker     Quit date: 2012     Years since quittin.7    Smokeless tobacco: Never Used   Vaping Use    Vaping Use: Never used   Substance and Sexual Activity    Alcohol use: No    Drug use: No    Sexual activity: Not on file   Other Topics Concern    Not on file   Social History Narrative    Not on file     Social Determinants of Health     Financial Resource Strain: Low Risk     Difficulty of Paying Living Expenses: Not hard at all   Food Insecurity: No Food Insecurity    Worried About Running Out of Food in the Last Year: Never true    Thor of Food in the Last Year: Never true   Transportation Needs:     Lack of Transportation (Medical):  Lack of Transportation (Non-Medical):    Physical Activity:     Days of Exercise per Week:     Minutes of Exercise per Session:    Stress:     Feeling of Stress :    Social Connections:     Frequency of Communication with Friends and Family:     Frequency of Social Gatherings with Friends and Family:     Attends Jewish Services:     Active Member of Clubs or Organizations:     Attends Club or Organization Meetings:     Marital Status:    Intimate Partner Violence:     Fear of Current or Ex-Partner:     Emotionally Abused:     Physically Abused:     Sexually Abused:          MEDICATIONS:  Current Facility-Administered Medications   Medication Dose Route Frequency Provider Last Rate Last Admin    lactated ringers infusion   IntraVENous Continuous Peterson Kent  mL/hr at 09/22/21 0840 New Bag at 09/22/21 0840           ALLERGIES:  Allergies as of 09/09/2021    (No Known Allergies)         REVIEW OF SYSTEMS:  General appearance:Appears healthy. Alert; in no acute distress. Pleasant. HEENT: Gneg  CARDIOVASCULAR: Denies: chest pain, dyspnea on exertion, palpitations  RESPIRATORY: Denies: cough, shortness of breath, wheezing  GI: Denies: abdominal pain, flank pain, nausea, vomiting, diarrhea  : Denies: dysuria, frequency/urgency, hematuria, +pelvic pain  MUSCULOSKELETAL: Denies: back pain, joint swelling, muscle pain  SKIN: Denies: rash, hives.   HEMATOLOGIC: Denies Bleeding Disorder, Coagulopathy or Transfusion  NEUROLOGIC: Denies Migraines, Headaches, CVA, TIA  ENDOCRINE: Denies any Endocrinologic disorders      Blood pressure (!) 155/102, pulse 97, temperature 97.7 °F (36.5 °C), temperature source Temporal, resp. rate 18, height 5' 6.5\" (1.689 m), weight 194 lb (88 kg), last menstrual period 02/07/2021, SpO2 97 %. General Appearance:  awake, alert, oriented, in no acute distress  Skin:  Skin color, texture, turgor normal. No rashes or lesions. Mouth/Throat:  Mucosa moist.  No lesions. Pharynx without erythema, edema or exudate. Neck:  neck- supple, no mass, non-tender  Lungs:  No chest wall tenderness. Heart:  Heart regular rate and rhythm  Abdomen:  Soft, non-tender, normal bowel sounds. No bruits, organomegaly or masses. Extremities: Extremities warm to touch, pink, with no edema.     Musculoskeletal:  negative  Peripheral Pulses:  Normal  Neurologic:  negative      Diagnostics:    06/03/2021 1152 06/03/2021 1442 Urinalysis [[id number]]   (Abnormal)    Component Value Units   Color, UA AMBERAbnormal      Clarity, UA CLOUDYAbnormal      Glucose, Urine NEGATIVE MG/DL   Bilirubin Urine NEGATIVE MG/DL   Ketones, Urine NEGATIVE MG/DL   Specific Gravity, UA 1.032      Blood, Urine NEGATIVE     pH, Urine 5.0     Protein, UA 100Abnormal  MG/DL   Urobilinogen, Urine NEGATIVE MG/DL   Nitrite Urine, Quantitative NEGATIVE     Leukocyte Esterase, Urine MODERATEAbnormal      RBC, UA 1 /HPF   WBC, UA 2 /HPF   Bacteria, UA OCCASIONALAbnormal  /HPF   Yeast, UA RARE /HPF   Squam Epithel, UA 1 /HPF   Mucus, UA FEWAbnormal  HPF   Trichomonas, UA NONE SEEN /HPF              06/03/2021 1152 06/03/2021 1606 Comprehensive Metabolic Panel [[id number]]   (Abnormal)    Component Value Units   Sodium 137 MMOL/L   Potassium 4.1 MMOL/L   Chloride 101 mMol/L   CO2 25 MMOL/L   BUN 6 MG/DL   CREATININE 0.7 MG/DL   Glucose 85 MG/DL   Calcium 9.3 MG/DL   Albumin 4.5 GM/DL   Total Protein 7.2 GM/DL   Total Bilirubin 0.4 MG/DL   ALT 123High  U/L   AST 54High  IU/L   Alkaline Phosphatase 205High  IU/L   GFR Non-African American >60 mL/min/1.73m2   GFR African American >60 mL/min/1.73m2 Anion Gap 11                06/03/2021 1152 06/03/2021 1427 CBC Auto Differential [[id number]]   (Abnormal)    Component Value Units   WBC 10. 6High  K/CU MM   RBC 4.79 M/CU MM   Hemoglobin 12.3Low  GM/DL   Hematocrit 38.9 %   MCV 81.2 FL   MCH 25.7Low  PG   MCHC 31.6Low  %   RDW 13.8 %   Platelets 221 K/CU MM   MPV 11.1 FL   Differential Type AUTOMATED DIFFERENTIAL     Segs Relative 72. 4High  %   Lymphocytes % 15.7Low  %   Monocytes % 6.6High  %   Eosinophils % 4.3High  %   Basophils % 0.3 %   Segs Absolute 7.7 K/CU MM   Lymphocytes Absolute 1.7 K/CU MM   Monocytes Absolute 0.7 K/CU MM   Eosinophils Absolute 0.5 K/CU MM   Basophils Absolute 0.0 K/CU MM   Nucleated RBC % 0.0 %   Total Nucleated RBC 0.0 K/CU MM   Total Immature Neutrophil 0.07 K/CU MM   Immature Neutrophil % 0.7High  %            Labs:  Results for orders placed or performed in visit on 09/16/21   COVID-19   Result Value Ref Range    SARS-CoV-2 Not Detected Not detected           ASSESSMENT:  Chronic female pelvic pain      Patient Active Problem List    Diagnosis Date Noted    Chronic female pelvic pain 09/16/2021    Menorrhalgia 02/16/2021    Thyroid nodule 10/17/2014          Plan:  Laparoscopy, possible oophorectomy      Counseling: The patient was counseled on all options both medical and surgical, conservative as well as definitive. She has elected to proceed with the procedure as stated above. The patient was counseled on the procedure. Risks and complications were reviewed in detail including infection, hemorrhage, blood transfusion, injury to organs such as bowel, bladder and neurovascular structures with the possibility of a prolonged postoperative course. The patients orders, labs, consents have been completed. The patient is aware that this procedure may not alleviate her symptoms. That there may be a necessity for a second surgery and that there may be an incomplete removal of abnormal tissue.         The patient was counseled at length about the risks of shonda Covid-19 during their perioperative period and any recovery window from their procedure. The patient was made aware that shonda Covid-19  may worsen their prognosis for recovering from their procedure  and lend to a higher morbidity and/or mortality risk. All material risks, benefits, and reasonable alternatives including postponing the procedure were discussed. The patient does wish to proceed with the procedure at this time.

## 2021-09-22 NOTE — OP NOTE
[address] [place] [address], [address] [place] [address]                                OPERATIVE REPORT    PATIENT NAME: Jordy Murphy                     :        1980  MED REC NO:   [id number]                          ROOM:  ACCOUNT NO:   [de-identified]                           ADMIT DATE: 2021  PROVIDER:     Tessy Sanchez MD    SURGEON:  Tessy Sanchez MD    PREOPERATIVE DIAGNOSIS:  Chronic female pelvic pain. POSTOPERATIVE DIAGNOSES:  1. Chronic female pelvic pain. 2.  Pelvic adhesions. 3.  Multicystic right ovary. 4.  Transverse colon adhered to the anterior abdominal wall. PROCEDURES:  1. Laparoscopy with right oophorectomy with adhesiolysis. 2.  Laparoscopy with enterolysis. ANESTHESIA:  General endotracheal anesthesia. EBL:  5 mL. SPECIMEN:  Right ovary. DRAINS:  None. COMPLICATIONS:  None. INDICATIONS FOR OPERATION:  A 53-year-old female with life-altering  female pelvic pain and dyspareunia, which was persistent despite a trial  of nonsteroidal anti-inflammatory drugs. Due to the life-altering  nature of her pain, she decided to have a laparoscopic evaluation. Findings show a sigmoid colon adhered to the anterior abdominal wall  directly above the umbilicus as well as omentum adhered to this area. The trocar went directly inferior to this transverse colon adhered to  the anterior abdominal wall. In addition, in the omentum, as it was  adhered to the anterior abdominal wall there were multiple spaces in the  omentum, which would definitely allow for small bowel obstruction at  some point in the future. In the pelvis, there were diffuse adhesions  between the sigmoid colon, the left ovary, the right ovary, the pelvic  sidewall as well as the apex of the vagina. All adhesions in the pelvis  were completely lysed with normal restoration of the anatomy. Photo  documentation was performed.   Due to the multicystic and enlarged nature  of the right ovary, the decision was made to perform a right  oophorectomy. In addition, the sigmoid colon and all omentum were  dissected free from the anterior abdominal wall superior to the  umbilicus. There were no concerns for any complications or bowel injury  from this dissection. The left ovary was normal in appearance once the  adhesions were removed, and therefore, it was left in place, so the  patient was not menopausal.    DESCRIPTION OF OPERATION:  After informed consent was obtained, the  patient was brought to the operating suite, where general endotracheal  anesthesia was obtained by the Anesthesia Service without complications. The patient was prepped and draped in normal sterile fashion. The  bladder was drained. The patient was in the supine position. All  incisions were infiltrated with 0.5% Marcaine with epinephrine prior to  the incisions being made. A 5-mm vertical skin incision was made at the  umbilical fold. A 5-mm trocar and sleeve were introduced into the  abdomen under direct visualization. A second skin incision was made 2  cm above the symphysis pubis in the midline. A 5-mm trocar and sleeve  were introduced into the abdomen under direct visualization. A third  skin incision was made in the patient's left lower quadrant. This is a  11-mm horizontal skin incision. An 11-mm trocar and sleeve were  introduced into the abdomen under direct visualization. A diagnostic  laparoscopy was performed as noted above. First, the transverse colon  was dissected off the anterior abdominal wall using sharp dissection, a  LigaSure device. There was one bleeding vessel where a laparoscopic  clip was applied. The omentum was removed. The sigmoid colon was  removed all directly superior to the umbilicus. There were no concerns  for any type of bowel injury or deserosalization of the sigmoid colon. Hemostasis was assured.   Next, extensive adhesiolysis was performed in  the pelvis, clearing both ovaries of all adhesions from the sigmoid  colon, the pelvic sidewall as well as the apex of the vagina. The additional time for adhesiolysis both in the pelvis and the upper abdominal sigmoid and omental adhesions took an additional 25 minutes of operative time and was in addition to removing the ovary. The left  ovary was small and normal in nature. The right ovary was multicystic. Ureters were identified bilaterally during this dissection. There was  no concern for ureteral injury. Next, the right  infundibulopelvic ligament was grasped with a blunt-tip LigaSure device,  sealed and cut. The right ovary was placed via an Endobag and removed. Pelvis and abdominal cavity were copiously irrigated. All surgical  sites were hemostatic. There was no further evidence of visible  pathology. The procedure was completed. In addition, a small appendix  was visualized, which was normal.  Left lower quadrant trocar was  removed. A Israel-Sina fascial closure system was introduced in the  left lower quadrant trocar site and the fascia here was closed with 0  Vicryl and two sutures. CO2 was released from the abdominal cavity. Midline trocars were removed from the abdominal cavity. Skin was closed  with 4-0 Monocryl in a subcuticular fashion. The patient was then  awoken from anesthesia, extubated in the operating room, brought to the  recovery room in stable condition. There were no complications.         Manuelito Campoverde MD    D: 09/22/2021 12:41:42       T: 09/22/2021 12:47:20     [email or web address]  Job#: [id number]     Doc#: [id number]    CC:  Family Physician       Maria Fernanda Barreto MD

## 2021-09-24 NOTE — ANESTHESIA POSTPROCEDURE EVALUATION
Department of Anesthesiology  Postprocedure Note    Patient: [facility]  MRN: [id number]  YOB: 1980  Date of evaluation: 9/24/2021  Time:  2:30 PM     Procedure Summary     Date: 09/22/21 Room / Location: [address]    Anesthesia Start: 0802 Anesthesia Stop: 1288    Procedure: LAPAROSCOPY EXPLORATORY, LYSIS OF ADHESIONS, RIGHT OOPHORECTOMY (N/A Abdomen) Diagnosis:       Pain pelvic      (PELVIC PAIN)    Surgeons: Tiara Berumen MD Responsible Provider: Faraz Capone MD    Anesthesia Type: general ASA Status: 2          Anesthesia Type: general    Prudence Phase I: Prudence Score: 9    Prudence Phase II: Prudence Score: 10    Last vitals: Reviewed and per EMR flowsheets.        Anesthesia Post Evaluation    Patient location during evaluation: PACU  Patient participation: complete - patient participated  Level of consciousness: sleepy but conscious  Pain score: 1  Airway patency: patent  Nausea & Vomiting: no nausea and no vomiting  Complications: no  Cardiovascular status: hemodynamically stable  Respiratory status: acceptable  Hydration status: euvolemic

## 2021-10-01 ENCOUNTER — OFFICE VISIT (OUTPATIENT)
Dept: OBGYN | Age: 41
End: 2021-10-01

## 2021-10-01 VITALS
BODY MASS INDEX: 30.22 KG/M2 | HEART RATE: 63 BPM | SYSTOLIC BLOOD PRESSURE: 165 MMHG | WEIGHT: 188 LBS | DIASTOLIC BLOOD PRESSURE: 100 MMHG | HEIGHT: 66 IN

## 2021-10-01 DIAGNOSIS — B37.31 VULVOVAGINAL CANDIDIASIS: ICD-10-CM

## 2021-10-01 DIAGNOSIS — Z09 POSTOPERATIVE EXAMINATION: Primary | ICD-10-CM

## 2021-10-01 LAB
BACTERIA: ABNORMAL /HPF
BASOPHILS ABSOLUTE: 0.1 K/UL (ref 0–0.2)
BASOPHILS RELATIVE PERCENT: 1.1 %
BILIRUBIN URINE: NEGATIVE
BLOOD, URINE: NEGATIVE
CLARITY: ABNORMAL
COLOR: YELLOW
EOSINOPHILS ABSOLUTE: 0.5 K/UL (ref 0–0.6)
EOSINOPHILS RELATIVE PERCENT: 5.4 %
EPITHELIAL CELLS, UA: 8 /HPF (ref 0–5)
GLUCOSE URINE: NEGATIVE MG/DL
HCT VFR BLD CALC: 41.2 % (ref 36–48)
HEMOGLOBIN: 13.4 G/DL (ref 12–16)
HYALINE CASTS: 1 /LPF (ref 0–8)
KETONES, URINE: NEGATIVE MG/DL
LEUKOCYTE ESTERASE, URINE: NEGATIVE
LYMPHOCYTES ABSOLUTE: 2.6 K/UL (ref 1–5.1)
LYMPHOCYTES RELATIVE PERCENT: 30.6 %
MCH RBC QN AUTO: 26.6 PG (ref 26–34)
MCHC RBC AUTO-ENTMCNC: 32.5 G/DL (ref 31–36)
MCV RBC AUTO: 81.7 FL (ref 80–100)
MICROSCOPIC EXAMINATION: YES
MONOCYTES ABSOLUTE: 0.5 K/UL (ref 0–1.3)
MONOCYTES RELATIVE PERCENT: 6.3 %
NEUTROPHILS ABSOLUTE: 4.9 K/UL (ref 1.7–7.7)
NEUTROPHILS RELATIVE PERCENT: 56.6 %
NITRITE, URINE: NEGATIVE
PDW BLD-RTO: 15.5 % (ref 12.4–15.4)
PH UA: 7.5 (ref 5–8)
PLATELET # BLD: 292 K/UL (ref 135–450)
PMV BLD AUTO: 9.5 FL (ref 5–10.5)
PROTEIN UA: NEGATIVE MG/DL
RBC # BLD: 5.05 M/UL (ref 4–5.2)
RBC UA: 3 /HPF (ref 0–4)
SPECIFIC GRAVITY UA: 1.01 (ref 1–1.03)
URINE TYPE: ABNORMAL
UROBILINOGEN, URINE: 0.2 E.U./DL
WBC # BLD: 8.6 K/UL (ref 4–11)
WBC UA: 1 /HPF (ref 0–5)

## 2021-10-01 PROCEDURE — 36415 COLL VENOUS BLD VENIPUNCTURE: CPT | Performed by: OBSTETRICS & GYNECOLOGY

## 2021-10-01 PROCEDURE — 99024 POSTOP FOLLOW-UP VISIT: CPT | Performed by: OBSTETRICS & GYNECOLOGY

## 2021-10-01 RX ORDER — FLUCONAZOLE 150 MG/1
150 TABLET ORAL
Qty: 2 TABLET | Refills: 0 | Status: SHIPPED | OUTPATIENT
Start: 2021-10-01 | End: 2021-10-04

## 2021-10-01 NOTE — PATIENT INSTRUCTIONS
PATIENT INSTRUCTIONS      ACTIVITY: Pt is to increase activities as tolerated. DIET: You may resume your normal diet.     RETURN TO WORK/SCHOOL: off work 2 more weeks    WOUND CARE: keep wound clean and dry

## 2021-10-01 NOTE — PROGRESS NOTES
Postop Progress Note    Subjective    Arcelia Booth presents to the office for postop follow up. Objective    Vitals:    10/01/21 1048   BP: (!) 165/100   Pulse: 63     General: alert, cooperative and no distress  Incision: healing well    Assessment  Doing well postoperatively. Except increased pain but extensive adhesiolysis  Cbc, urinalysis    Plan  1. Continue any current medications  2. Wound care discussed  3. Pt is to increase activities as tolerated  4. Usual diet  5.  Follow up: as needed      Diflucan vor yeast caused by antibiotics for infected tooth  Electronically signed by Rae Castellanos MD on 10/1/2021 at 11:45 AM

## 2021-10-18 ENCOUNTER — OFFICE VISIT (OUTPATIENT)
Dept: OBGYN | Age: 41
End: 2021-10-18

## 2021-10-18 VITALS
HEIGHT: 66 IN | WEIGHT: 189 LBS | HEART RATE: 59 BPM | DIASTOLIC BLOOD PRESSURE: 109 MMHG | SYSTOLIC BLOOD PRESSURE: 164 MMHG | BODY MASS INDEX: 30.37 KG/M2

## 2021-10-18 DIAGNOSIS — M79.89 LEFT LEG SWELLING: ICD-10-CM

## 2021-10-18 DIAGNOSIS — Z09 POSTOPERATIVE EXAMINATION: Primary | ICD-10-CM

## 2021-10-18 DIAGNOSIS — M79.605 PAIN OF LEFT LOWER EXTREMITY: ICD-10-CM

## 2021-10-18 DIAGNOSIS — R53.83 OTHER FATIGUE: ICD-10-CM

## 2021-10-18 LAB
BASOPHILS ABSOLUTE: 0 K/UL (ref 0–0.2)
BASOPHILS RELATIVE PERCENT: 0.4 %
EOSINOPHILS ABSOLUTE: 0.2 K/UL (ref 0–0.6)
EOSINOPHILS RELATIVE PERCENT: 3.4 %
HCT VFR BLD CALC: 37.6 % (ref 36–48)
HEMOGLOBIN: 12.6 G/DL (ref 12–16)
LYMPHOCYTES ABSOLUTE: 2.5 K/UL (ref 1–5.1)
LYMPHOCYTES RELATIVE PERCENT: 34.6 %
MCH RBC QN AUTO: 27.5 PG (ref 26–34)
MCHC RBC AUTO-ENTMCNC: 33.5 G/DL (ref 31–36)
MCV RBC AUTO: 82 FL (ref 80–100)
MONOCYTES ABSOLUTE: 0.4 K/UL (ref 0–1.3)
MONOCYTES RELATIVE PERCENT: 5.2 %
NEUTROPHILS ABSOLUTE: 4 K/UL (ref 1.7–7.7)
NEUTROPHILS RELATIVE PERCENT: 56.4 %
PDW BLD-RTO: 15.2 % (ref 12.4–15.4)
PLATELET # BLD: 213 K/UL (ref 135–450)
PMV BLD AUTO: 9.6 FL (ref 5–10.5)
RBC # BLD: 4.59 M/UL (ref 4–5.2)
TSH REFLEX FT4: 1.36 UIU/ML (ref 0.27–4.2)
WBC # BLD: 7.2 K/UL (ref 4–11)

## 2021-10-18 PROCEDURE — 99024 POSTOP FOLLOW-UP VISIT: CPT | Performed by: OBSTETRICS & GYNECOLOGY

## 2021-10-18 PROCEDURE — 36415 COLL VENOUS BLD VENIPUNCTURE: CPT | Performed by: OBSTETRICS & GYNECOLOGY

## 2021-10-18 NOTE — PROGRESS NOTES
10/18/21    Alvin Colin  1980    Chief Complaint   Patient presents with    Post-Op Check     pt here for post-op, c/o pelvic, back, abdominal, leg pain and fatigue. Alvin Colin is a 42 y.o. female who presents today for evaluation of postop check but reports above complaints. NO nausea or vomting. NOrmal BM. No fever/chills. Past Medical History:   Diagnosis Date    Abdominal gas pain     Asthma     Back pain     Constipation     Fatigue     Kidney stone     Left flank pain     Leg pain     Pelvic pain        Past Surgical History:   Procedure Laterality Date    CYSTOSCOPY N/A 2021    CYSTOSCOPY performed by Wilver Otero MD at [address] [place] [address], VAGINAL N/A 2021    [address]ectomy     LAPAROSCOPY N/A 2021    LAPAROSCOPY EXPLORATORY, LYSIS OF ADHESIONS, RIGHT OOPHORECTOMY performed by Lars Tidwell MD at [place] [address] Right 2021    OVARY REMOVAL  2021    right    TUBAL LIGATION         Social History     Tobacco Use    Smoking status: Former Smoker     Quit date: 2012     Years since quittin.8    Smokeless tobacco: Never Used   Vaping Use    Vaping Use: Never used   Substance Use Topics    Alcohol use: No    Drug use: No       Family History   Problem Relation Age of Onset    Other Father         Lupus       Current Outpatient Medications   Medication Sig Dispense Refill    ibuprofen (ADVIL;MOTRIN) 800 MG tablet Take 1 tablet by mouth every 8 hours as needed for Pain 60 tablet 2    albuterol sulfate  (90 BASE) MCG/ACT inhaler Inhale 2 puffs into the lungs every 4 hours as needed for Wheezing       No current facility-administered medications for this visit. No Known Allergies          There is no immunization history on file for this patient.     Review of Systems    BP (!) 164/109   Pulse 59   Ht 5' 6\" (1.676 m)   Wt 189 lb (85.7 kg)   LMP 02/07/2021   BMI 30.51 kg/m²     Physical Exam  Constitutional:       General: She is not in acute distress. Appearance: Normal appearance. She is not ill-appearing. HENT:      Head: Normocephalic. Nose: Nose normal.   Eyes:      General: No scleral icterus. Right eye: No discharge. Left eye: No discharge. Cardiovascular:      Pulses: Normal pulses. Pulmonary:      Effort: Pulmonary effort is normal.   Abdominal:      General: Abdomen is flat. There is no distension. Palpations: Abdomen is soft. There is no mass. Tenderness: There is abdominal tenderness in the left lower quadrant. Hernia: No hernia is present. Comments: Tenderness is at llq incision, no hernia   Musculoskeletal:         General: Normal range of motion. Cervical back: Normal range of motion and neck supple. No rigidity. Skin:     General: Skin is warm and dry. Neurological:      General: No focal deficit present. Mental Status: She is alert and oriented to person, place, and time. Psychiatric:         Mood and Affect: Mood normal.         Behavior: Behavior normal.         No results found for this visit on 10/18/21. ASSESSMENT AND PLAN   Diagnosis Orders   1. Postoperative examination  CBC Auto Differential    TSH WITH REFLEX TO FT4    US DUP LOWER EXTREMITY LEFT TRINY   2. Pain of left lower extremity  US DUP LOWER EXTREMITY LEFT TRINY   3. Other fatigue  CBC Auto Differential    TSH WITH REFLEX TO FT4    US DUP LOWER EXTREMITY LEFT TRINY   4. Left leg swelling  US DUP LOWER EXTREMITY LEFT TRINY     Low suspicion for DVT but she is postop and does have leg complaints. Discussed that pain appears to be from facial closure which should resolve  Return in about 4 weeks (around 11/15/2021).     Daylene Hodgkins, MD

## 2021-10-20 ENCOUNTER — HOSPITAL ENCOUNTER (OUTPATIENT)
Dept: ULTRASOUND IMAGING | Age: 41
Discharge: HOME | End: 2021-10-20

## 2021-10-20 DIAGNOSIS — M79.89 LEFT LEG SWELLING: ICD-10-CM

## 2021-10-20 DIAGNOSIS — M79.605 PAIN OF LEFT LOWER EXTREMITY: ICD-10-CM

## 2021-10-20 DIAGNOSIS — R53.83 OTHER FATIGUE: ICD-10-CM

## 2021-10-20 DIAGNOSIS — Z09 POSTOPERATIVE EXAMINATION: ICD-10-CM

## 2021-10-20 PROCEDURE — 93971 EXTREMITY STUDY: CPT

## 2021-11-15 ENCOUNTER — OFFICE VISIT (OUTPATIENT)
Dept: OBGYN | Age: 41
End: 2021-11-15

## 2021-11-15 VITALS
BODY MASS INDEX: 30.37 KG/M2 | WEIGHT: 189 LBS | HEIGHT: 66 IN | DIASTOLIC BLOOD PRESSURE: 89 MMHG | SYSTOLIC BLOOD PRESSURE: 139 MMHG

## 2021-11-15 DIAGNOSIS — F33.8 SEASONAL AFFECTIVE DISORDER (HCC): ICD-10-CM

## 2021-11-15 DIAGNOSIS — F41.1 GAD (GENERALIZED ANXIETY DISORDER): Primary | ICD-10-CM

## 2021-11-15 PROCEDURE — 99214 OFFICE O/P EST MOD 30 MIN: CPT | Performed by: OBSTETRICS & GYNECOLOGY

## 2021-11-15 RX ORDER — ESCITALOPRAM OXALATE 10 MG/1
10 TABLET ORAL DAILY
Qty: 30 TABLET | Refills: 5 | Status: SHIPPED | OUTPATIENT
Start: 2021-11-15

## 2021-11-15 ASSESSMENT — ENCOUNTER SYMPTOMS
ALLERGIC/IMMUNOLOGIC NEGATIVE: 1
EYES NEGATIVE: 1
GASTROINTESTINAL NEGATIVE: 1
RESPIRATORY NEGATIVE: 1

## 2021-11-15 NOTE — PROGRESS NOTES
11/15/21    Eric Curling  1980    Chief Complaint   Patient presents with    Post-Op Check     pt here to f/u  on u/s and labs. c/o anxiety x 1 month.  Follow-up        Eric Curling is a 42 y.o. female who presents today for evaluation of anxiety. Reports several times per week having severe anxiety. Also reports long history of dysthymia in the winter. Past Medical History:   Diagnosis Date    Abdominal gas pain     Asthma     Back pain     Constipation     Fatigue     Kidney stone     Left flank pain     Leg pain     Pelvic pain        Past Surgical History:   Procedure Laterality Date    CYSTOSCOPY N/A 2021    CYSTOSCOPY performed by Aniket Huerta MD at [address] [place] [address], VAGINAL N/A 2021    [address] with bilateral [address]     HYSTEROSCOPY      LAPAROSCOPY N/A 2021    LAPAROSCOPY EXPLORATORY, LYSIS OF ADHESIONS, RIGHT OOPHORECTOMY performed by Daylene Hodgkins, MD at [place] [address] Right 2021    OVARY REMOVAL  2021    right    TUBAL LIGATION         Social History     Tobacco Use    Smoking status: Former Smoker     Quit date: 2012     Years since quittin.8    Smokeless tobacco: Never Used   Vaping Use    Vaping Use: Never used   Substance Use Topics    Alcohol use: No    Drug use: No       Family History   Problem Relation Age of Onset    Other Father         Lupus       Current Outpatient Medications   Medication Sig Dispense Refill    escitalopram (LEXAPRO) 10 MG tablet Take 1 tablet by mouth daily 30 tablet 5    ibuprofen (ADVIL;MOTRIN) 800 MG tablet Take 1 tablet by mouth every 8 hours as needed for Pain 60 tablet 2    albuterol sulfate  (90 BASE) MCG/ACT inhaler Inhale 2 puffs into the lungs every 4 hours as needed for Wheezing       No current facility-administered medications for this visit.        No Known Allergies          There is no immunization history on file for this patient. Review of Systems   Constitutional: Negative. Eyes: Negative. Respiratory: Negative. Cardiovascular: Negative. Gastrointestinal: Negative. Endocrine: Negative. Genitourinary: Negative. Musculoskeletal: Negative. Skin: Negative. Allergic/Immunologic: Negative. Neurological: Negative. Hematological: Negative. Psychiatric/Behavioral: The patient is nervous/anxious. /89   Ht 5' 6\" (1.676 m)   Wt 189 lb (85.7 kg)   LMP 2021   BMI 30.51 kg/m²     Physical Exam    No results found for this visit on 11/15/21.  10/18/2021 1323 10/18/2021 2118 TSH WITH REFLEX TO FT4 [[id number]]    Blood    Component Value Units   TSH Reflex FT4 1.36 uIU/mL           10/18/2021 1323 10/18/2021 2032 CBC Auto Differential [[id number]]    Blood    Component Value Units   WBC 7.2 K/uL   RBC 4.59 M/uL   Hemoglobin 12.6 g/dL   Hematocrit 37.6 %   MCV 82.0 fL   MCH 27.5 pg   MCHC 33.5 g/dL   RDW 15.2 %   Platelets 742 K/uL   MPV 9.6 fL   Neutrophils % 56.4 %   Lymphocytes % 34.6 %   Monocytes % 5.2 %   Eosinophils % 3.4 %   Basophils % 0.4 %   Neutrophils Absolute 4.0 K/uL   Lymphocytes Absolute 2.5 K/uL   Monocytes Absolute 0.4 K/uL   Eosinophils Absolute 0.2 K/uL   Basophils Absolute 0.0 K/uL               ASSESSMENT AND PLAN   Diagnosis Orders   1. LUH (generalized anxiety disorder)  escitalopram (LEXAPRO) 10 MG tablet   2. Seasonal affective disorder ([facility] [address] [address]  escitalopram (LEXAPRO) 10 MG tablet       Return if symptoms worsen or fail to improve.     Flores Lind MD

## 2022-01-12 ENCOUNTER — HOSPITAL ENCOUNTER (OUTPATIENT)
Dept: WOMENS IMAGING | Age: 42
Discharge: HOME | End: 2022-01-12
Payer: COMMERCIAL

## 2022-01-12 DIAGNOSIS — Z12.39 SCREENING BREAST EXAMINATION: ICD-10-CM

## 2022-01-12 PROCEDURE — 77063 BREAST TOMOSYNTHESIS BI: CPT

## 2023-06-23 DIAGNOSIS — E04.1 THYROID NODULE: Primary | ICD-10-CM

## 2023-06-23 NOTE — PROGRESS NOTES
Called patient and informed her of results. Discussed recommendation for FNA and patient agreeable. Orders placed.

## 2023-06-28 RX ORDER — ALBUTEROL SULFATE 90 UG/1
2 AEROSOL, METERED RESPIRATORY (INHALATION) EVERY 4 HOURS PRN
Qty: 18 G | Refills: 2 | Status: SHIPPED | OUTPATIENT
Start: 2023-06-28

## 2023-06-30 ENCOUNTER — HOSPITAL ENCOUNTER (OUTPATIENT)
Dept: WOMENS IMAGING | Age: 43
Discharge: HOME | End: 2023-06-30
Payer: COMMERCIAL

## 2023-06-30 DIAGNOSIS — Z12.31 SCREENING MAMMOGRAM FOR BREAST CANCER: ICD-10-CM

## 2023-06-30 PROCEDURE — 77063 BREAST TOMOSYNTHESIS BI: CPT

## 2023-07-07 ENCOUNTER — NURSE ONLY (OUTPATIENT)
Dept: INTERNAL MEDICINE CLINIC | Age: 43
End: 2023-07-07

## 2023-07-07 VITALS — DIASTOLIC BLOOD PRESSURE: 90 MMHG | SYSTOLIC BLOOD PRESSURE: 140 MMHG

## 2023-07-07 DIAGNOSIS — I10 HYPERTENSION, UNSPECIFIED TYPE: Primary | ICD-10-CM

## 2023-07-07 PROCEDURE — 99999 PR OFFICE/OUTPT VISIT,PROCEDURE ONLY: CPT

## 2023-07-07 RX ORDER — LOSARTAN POTASSIUM AND HYDROCHLOROTHIAZIDE 25; 100 MG/1; MG/1
1 TABLET ORAL DAILY
Qty: 30 TABLET | Refills: 0 | Status: SHIPPED | OUTPATIENT
Start: 2023-07-07

## 2023-07-07 NOTE — PROGRESS NOTES
Pt came in on this date for BP check    BP was 140/90 taken on the L upper arm     Provider was notified-provider doubled BP medication-pt notified

## 2023-07-10 ENCOUNTER — HOSPITAL ENCOUNTER (OUTPATIENT)
Dept: INTERVENTIONAL RADIOLOGY/VASCULAR | Age: 43
Discharge: HOME | End: 2023-07-10
Payer: COMMERCIAL

## 2023-07-10 VITALS
BODY MASS INDEX: 32.14 KG/M2 | RESPIRATION RATE: 18 BRPM | SYSTOLIC BLOOD PRESSURE: 160 MMHG | WEIGHT: 200 LBS | DIASTOLIC BLOOD PRESSURE: 82 MMHG | HEART RATE: 73 BPM | OXYGEN SATURATION: 99 % | TEMPERATURE: 97.5 F | HEIGHT: 66 IN

## 2023-07-10 DIAGNOSIS — E04.1 NONTOXIC UNINODULAR GOITER: ICD-10-CM

## 2023-07-10 PROCEDURE — 2709999900 IR BIOPSY THYROID PERC CORE NEEDLE

## 2023-07-10 PROCEDURE — 88172 CYTP DX EVAL FNA 1ST EA SITE: CPT | Performed by: PATHOLOGY

## 2023-07-10 PROCEDURE — 88177 CYTP FNA EVAL EA ADDL: CPT | Performed by: PATHOLOGY

## 2023-07-10 PROCEDURE — 76942 ECHO GUIDE FOR BIOPSY: CPT

## 2023-07-10 PROCEDURE — 88173 CYTOPATH EVAL FNA REPORT: CPT | Performed by: PATHOLOGY

## 2023-07-10 PROCEDURE — 60100 BIOPSY OF THYROID: CPT

## 2023-07-10 PROCEDURE — 88305 TISSUE EXAM BY PATHOLOGIST: CPT | Performed by: PATHOLOGY

## 2023-07-10 RX ORDER — SODIUM CHLORIDE 0.9 % (FLUSH) 0.9 %
10 SYRINGE (ML) INJECTION PRN
Status: DISCONTINUED | OUTPATIENT
Start: 2023-07-10 | End: 2023-07-11 | Stop reason: HOSPADM

## 2023-07-10 ASSESSMENT — PAIN DESCRIPTION - LOCATION: LOCATION: NECK

## 2023-07-10 ASSESSMENT — PAIN - FUNCTIONAL ASSESSMENT: PAIN_FUNCTIONAL_ASSESSMENT: 0-10

## 2023-07-10 ASSESSMENT — PAIN SCALES - GENERAL: PAINLEVEL_OUTOF10: 2

## 2023-07-10 NOTE — OR NURSING
PROCEDURE PERFORMED:  Thyroid Biopsy    PRIMARY INDICATION FOR PROCEDURE: nodule    INFORMED CONSENT:  Obtained prior to procedure. Consent placed in chart. ASSESSMENT:  Pt is alert and oriented x4. Pt verbalizes understanding of procedure. BARRIER PRECAUTIONS & STERILE TECHNIQUE:               Pt remains in bed and positioned supine for procedure. Warm blankets given. Pt placed on Vital Signs Monitor. Pt prepped and draped in a sterile fashion with chlorhexadine. 26 - Dr Bryan Arceo arrived to case.     PAIN/LOCAL ANESTHESIA/SEDATION MANAGEMENT:           Local: Lidocaine 1% given by Dr Bryan Arceo at 1120 to left side; at 1128 to right side           9 US Images                                                  INTRAOPERATIVE:                   FINAL IMAGE TAKEN TO CONFIRM PLACEMENT OF: Needle 23g x 1.5in      1123 - first FNA removed left side  1124 - second FNA removed left side  1129 - third FNA removed right side  1130 - fourth FNA removed right side  1132 - post US images of left side  1134 - post US images of right side            STERILE DRESSINGS:  bandaid right and left side    EBL:    <1 cc      COMPLICATIONS: none    REPORT GIVEN  TO: RN at [facility] in [facility] - Roberto Principal [facility]

## 2023-07-10 NOTE — PROGRESS NOTES
1152:  Discharge instructions discussed with patient and mother, both verbalized an understanding. Pt discharged to home alert and oriented with c/o minimal neck discomfort. Band aides x2 to neck with no bleeding noted. Pt denies difficulty breathing or swallowing. VSS.

## 2023-07-11 DIAGNOSIS — F32.9 REACTIVE DEPRESSION: ICD-10-CM

## 2023-07-11 RX ORDER — BUPROPION HYDROCHLORIDE 150 MG/1
150 TABLET ORAL EVERY MORNING
Qty: 30 TABLET | Refills: 0 | Status: SHIPPED | OUTPATIENT
Start: 2023-07-11

## 2023-07-27 ENCOUNTER — PATIENT MESSAGE (OUTPATIENT)
Dept: INTERNAL MEDICINE CLINIC | Age: 43
End: 2023-07-27

## 2023-07-27 DIAGNOSIS — F32.9 REACTIVE DEPRESSION: Primary | ICD-10-CM

## 2023-08-01 ENCOUNTER — PROCEDURE VISIT (OUTPATIENT)
Dept: PHYSICAL MEDICINE AND REHAB | Age: 43
End: 2023-08-01
Payer: COMMERCIAL

## 2023-08-01 DIAGNOSIS — G56.03 BILATERAL CARPAL TUNNEL SYNDROME: Primary | ICD-10-CM

## 2023-08-01 DIAGNOSIS — M79.602 PARESTHESIA AND PAIN OF BOTH UPPER EXTREMITIES: ICD-10-CM

## 2023-08-01 DIAGNOSIS — R20.2 PARESTHESIA AND PAIN OF BOTH UPPER EXTREMITIES: ICD-10-CM

## 2023-08-01 DIAGNOSIS — M79.601 PARESTHESIA AND PAIN OF BOTH UPPER EXTREMITIES: ICD-10-CM

## 2023-08-01 PROCEDURE — 95886 MUSC TEST DONE W/N TEST COMP: CPT | Performed by: PHYSICAL MEDICINE & REHABILITATION

## 2023-08-01 PROCEDURE — 95911 NRV CNDJ TEST 9-10 STUDIES: CPT | Performed by: PHYSICAL MEDICINE & REHABILITATION

## 2023-08-01 NOTE — PROGRESS NOTES
EMG REPORT     CHIEF COMPLAINT: Recurrent bilateral forearm and hand pain with burning. HISTORY OF PRESENT ILLNESS: 39 y.o. right hand dominant female with right more than left forearm and hand pain with burning and tingling in the thumb and long fingers for at least 9 months. She recalls having similar difficulty to a lesser degree as far back as 2014 (with an EMG that year). Now she denies significant neck pain or muscle cramping. She gets burning and stinging in both hands and she struggles to sleep because of the symptoms. She rated her pain severity as 10/10. She has not dropped things from her , but thinks she is \"getting ready to\". No history of diabetes or any thyroid disorder. PHYSICAL EXAMINATION: Alert. About 75 degrees of active cervical spine rotation bilaterally. Spurling's maneuver was negative. Upper limb reflexes were 1+ and symmetric. Tinel's sign was negative. There was no focal weakness with upper limb manual muscle testing. Perception of touch was distorted over the thenar eminences and long fingers of each hand, more so on the right. There was no atrophy, tremor or clonus noted. NERVE CONDUCTION STUDIES:     MOTOR         LATENCY NORMAL AMPLITUDE DISTANCE COND. CHRIS. RIGHT  MEDIAN 4.8 < 4.2 msec 7.6 8 cm 67   LEFT  MEDIAN 4.1 < 4.2 msec 10.6 8 cm >50   RIGHT  ULNAR 2.7 < 4.2 msec 6.7 8 cm 56   LEFT  ULNAR 2.6 < 4.2 msec 5.8 8 cm >50      SENSORY  ORTHODROMIC        LATENCY NORMAL AMPLITUDE DISTANCE   RIGHT MEDIAN 3.6 <2.3 msec 28 10 cm   LEFT  MEDIAN 3.1 < 2.3 msec 15 10 cm   RIGHT  ULNAR 2.1 < 2.3 msec 10 10 cm   LEFT  ULNAR 2.2 < 2.3 msec 28 10 cm       Right dorsal ulnar sensory: dL 2.4 msec, amp 11 microV.         NEEDLE EMG:      RIGHT   LEFT     Insertional Activity Spontaneous  Activity Volutional  MUAP's Insertional Activity Spontaneous  Activity Volutional  MUAP's   Cerv Parasp Normal None Normal Normal None Normal   Infraspinatus Normal None Normal Normal

## 2023-08-04 RX ORDER — BUPROPION HYDROCHLORIDE 300 MG/1
300 TABLET ORAL EVERY MORNING
Qty: 90 TABLET | Refills: 1 | Status: SHIPPED | OUTPATIENT
Start: 2023-08-04 | End: 2023-11-02

## 2023-08-16 DIAGNOSIS — I10 HYPERTENSION, UNSPECIFIED TYPE: ICD-10-CM

## 2023-08-17 RX ORDER — LOSARTAN POTASSIUM AND HYDROCHLOROTHIAZIDE 25; 100 MG/1; MG/1
1 TABLET ORAL DAILY
Qty: 30 TABLET | Refills: 0 | Status: SHIPPED | OUTPATIENT
Start: 2023-08-17

## 2023-08-28 ENCOUNTER — PATIENT MESSAGE (OUTPATIENT)
Dept: INTERNAL MEDICINE CLINIC | Age: 43
End: 2023-08-28

## 2023-08-28 DIAGNOSIS — G56.03 CARPAL TUNNEL SYNDROME, BILATERAL: Primary | ICD-10-CM

## 2023-09-17 DIAGNOSIS — I10 HYPERTENSION, UNSPECIFIED TYPE: ICD-10-CM

## 2023-09-18 RX ORDER — LOSARTAN POTASSIUM AND HYDROCHLOROTHIAZIDE 25; 100 MG/1; MG/1
1 TABLET ORAL DAILY
Qty: 30 TABLET | Refills: 0 | Status: SHIPPED | OUTPATIENT
Start: 2023-09-18

## 2023-09-21 ENCOUNTER — OFFICE VISIT (OUTPATIENT)
Dept: ORTHOPEDIC SURGERY | Age: 43
End: 2023-09-21
Payer: COMMERCIAL

## 2023-09-21 VITALS — OXYGEN SATURATION: 100 % | TEMPERATURE: 98 F | RESPIRATION RATE: 18 BRPM | HEART RATE: 79 BPM

## 2023-09-21 DIAGNOSIS — G56.02 LEFT CARPAL TUNNEL SYNDROME: ICD-10-CM

## 2023-09-21 DIAGNOSIS — G56.01 RIGHT CARPAL TUNNEL SYNDROME: Primary | ICD-10-CM

## 2023-09-21 PROCEDURE — G8417 CALC BMI ABV UP PARAM F/U: HCPCS | Performed by: ORTHOPAEDIC SURGERY

## 2023-09-21 PROCEDURE — 1036F TOBACCO NON-USER: CPT | Performed by: ORTHOPAEDIC SURGERY

## 2023-09-21 PROCEDURE — G8427 DOCREV CUR MEDS BY ELIG CLIN: HCPCS | Performed by: ORTHOPAEDIC SURGERY

## 2023-09-21 PROCEDURE — 99203 OFFICE O/P NEW LOW 30 MIN: CPT | Performed by: ORTHOPAEDIC SURGERY

## 2023-09-21 ASSESSMENT — ENCOUNTER SYMPTOMS
WHEEZING: 0
SHORTNESS OF BREATH: 0
VOMITING: 0
EYE REDNESS: 0
EYE PAIN: 0
COLOR CHANGE: 0
CHEST TIGHTNESS: 0

## 2023-09-21 NOTE — PROGRESS NOTES
Patient seen in office today for:  ALONSO CTS, R>L    DOI:  n/a   DOS: n/a   Date of last injection: n/a     Patient reports 10/10 pain at night  RICE and medication are not effective to alleviate pain and reduce swelling. Pain also alleviated by: Bracing at HS      EMG performed 8/1/2023, impression below. IMPRESSION:                  1.  Abnormal EMG. There is a mild to moderate median neuropathy at the right wrist (mild to moderate right carpal tunnel syndrome). 2.  Slightly milder left carpal tunnel syndrome. 3.  No evidence of a concurrent cervical spinal nerve root lesion (radiculopathy), plexopathy, generalized neuropathy or primary muscle disease.      Profession: Teacher    Left handed
anti-inflammatory medications, and night bracing. Due to the severity of symptoms I have recommended surgical intervention. This will consist of a carpal tunnel release. We discussed the severity of the findings on the EMG with more severe findings on the right side than the left. We discussed the risks, benefits, and alternatives to surgery as well as the postoperative course. Risks include persistent pain, numbness, weakness, infection, stiffness, recurrence, and incomplete recovery of the nerve. The patient understands and would like to proceed with carpal tunnel release. Plan will be to proceed with right carpal tunnel release under monitored anesthesia care.     If she does well with the initial surgery then we will stage and plan to have the left carpal tunnel surgery 2 weeks after the initial procedure    Electronically signed by Symone Moralez MD on 9/21/2023 at 4:39 PM

## 2023-09-21 NOTE — PATIENT INSTRUCTIONS
We will schedule surgery at your convenience. If you have any questions regarding your surgery, please call our office and ask to speak with Inocencia Corral [phone].

## 2023-09-25 ENCOUNTER — TELEPHONE (OUTPATIENT)
Dept: ORTHOPEDIC SURGERY | Age: 43
End: 2023-09-25

## 2023-09-25 NOTE — TELEPHONE ENCOUNTER
Patient is scheduled for bilateral carpal tunnel release with two separate surgery dates, which goes as follow:    Right Carpal Tunnel Release  CPT: 88890  ICD 10: G56.01  Surgery Date: 10/9/2023  Surgeon: Sukh Finders: [facility]  Anesthesia: MAC    Clearance sent to:  PCP: Gabrielle Perez      Left Carpal Tunnel Release  CPT: 94971  ICD 10: G56.02  Surgery Date: 10/23/2023  Surgeon: Sukh Villanuevaers: [facility]  Anesthesia: MAC    Insurance: Rei Paget started on 9/25/23 via [employer] portal. Clinicals uploaded. Request was done for both surgery types/dates.   Pending clinical review  Ref#: [id number]

## 2023-10-06 NOTE — PROGRESS NOTES
Patient notified of surgery time at [facility] 10/9/23 1145 arrival 0945, NPO instructions and DOS meds reviewed

## 2023-10-07 ENCOUNTER — ANESTHESIA EVENT (OUTPATIENT)
Dept: OPERATING ROOM | Age: 43
End: 2023-10-07
Payer: COMMERCIAL

## 2023-10-07 NOTE — ANESTHESIA PRE PROCEDURE
.                 Abdominal:             Vascular: Other Findings:             Anesthesia Plan      MAC     ASA 2       Induction: intravenous. Anesthetic plan and risks discussed with patient. Plan discussed with CRNA.     Attending anesthesiologist reviewed and agrees with [address] [place], [place] [address] - CRNA   10/7/2023

## 2023-10-09 ENCOUNTER — HOSPITAL ENCOUNTER (OUTPATIENT)
Age: 43
Setting detail: OUTPATIENT SURGERY
Discharge: HOME | End: 2023-10-09
Attending: ORTHOPAEDIC SURGERY | Admitting: ORTHOPAEDIC SURGERY
Payer: COMMERCIAL

## 2023-10-09 ENCOUNTER — ANESTHESIA (OUTPATIENT)
Dept: OPERATING ROOM | Age: 43
End: 2023-10-09
Payer: COMMERCIAL

## 2023-10-09 VITALS
SYSTOLIC BLOOD PRESSURE: 113 MMHG | TEMPERATURE: 98 F | HEART RATE: 64 BPM | OXYGEN SATURATION: 99 % | DIASTOLIC BLOOD PRESSURE: 77 MMHG | RESPIRATION RATE: 16 BRPM

## 2023-10-09 DIAGNOSIS — G56.01 RIGHT CARPAL TUNNEL SYNDROME: Primary | ICD-10-CM

## 2023-10-09 PROCEDURE — 6360000002 HC RX W HCPCS: Performed by: NURSE ANESTHETIST, CERTIFIED REGISTERED

## 2023-10-09 PROCEDURE — 7100000011 HC PHASE II RECOVERY - ADDTL 15 MIN: Performed by: ORTHOPAEDIC SURGERY

## 2023-10-09 PROCEDURE — 3600000012 HC SURGERY LEVEL 2 ADDTL 15MIN: Performed by: ORTHOPAEDIC SURGERY

## 2023-10-09 PROCEDURE — 7100000010 HC PHASE II RECOVERY - FIRST 15 MIN: Performed by: ORTHOPAEDIC SURGERY

## 2023-10-09 PROCEDURE — 2709999900 HC NON-CHARGEABLE SUPPLY: Performed by: ORTHOPAEDIC SURGERY

## 2023-10-09 PROCEDURE — 3700000000 HC ANESTHESIA ATTENDED CARE: Performed by: ORTHOPAEDIC SURGERY

## 2023-10-09 PROCEDURE — 3700000001 HC ADD 15 MINUTES (ANESTHESIA): Performed by: ORTHOPAEDIC SURGERY

## 2023-10-09 PROCEDURE — 2500000003 HC RX 250 WO HCPCS: Performed by: ORTHOPAEDIC SURGERY

## 2023-10-09 PROCEDURE — 6360000002 HC RX W HCPCS: Performed by: ORTHOPAEDIC SURGERY

## 2023-10-09 PROCEDURE — 2580000003 HC RX 258: Performed by: ANESTHESIOLOGY

## 2023-10-09 PROCEDURE — 3600000002 HC SURGERY LEVEL 2 BASE: Performed by: ORTHOPAEDIC SURGERY

## 2023-10-09 PROCEDURE — 2580000003 HC RX 258: Performed by: ORTHOPAEDIC SURGERY

## 2023-10-09 RX ORDER — SODIUM CHLORIDE, SODIUM LACTATE, POTASSIUM CHLORIDE, CALCIUM CHLORIDE 600; 310; 30; 20 MG/100ML; MG/100ML; MG/100ML; MG/100ML
INJECTION, SOLUTION INTRAVENOUS CONTINUOUS
Status: DISCONTINUED | OUTPATIENT
Start: 2023-10-09 | End: 2023-10-09 | Stop reason: HOSPADM

## 2023-10-09 RX ORDER — LIDOCAINE HYDROCHLORIDE 10 MG/ML
INJECTION, SOLUTION EPIDURAL; INFILTRATION; INTRACAUDAL; PERINEURAL
Status: COMPLETED | OUTPATIENT
Start: 2023-10-09 | End: 2023-10-09

## 2023-10-09 RX ORDER — PROPOFOL 10 MG/ML
INJECTION, EMULSION INTRAVENOUS PRN
Status: DISCONTINUED | OUTPATIENT
Start: 2023-10-09 | End: 2023-10-09 | Stop reason: SDUPTHER

## 2023-10-09 RX ORDER — ONDANSETRON 2 MG/ML
INJECTION INTRAMUSCULAR; INTRAVENOUS PRN
Status: DISCONTINUED | OUTPATIENT
Start: 2023-10-09 | End: 2023-10-09 | Stop reason: SDUPTHER

## 2023-10-09 RX ORDER — FENTANYL CITRATE 50 UG/ML
INJECTION, SOLUTION INTRAMUSCULAR; INTRAVENOUS PRN
Status: DISCONTINUED | OUTPATIENT
Start: 2023-10-09 | End: 2023-10-09 | Stop reason: SDUPTHER

## 2023-10-09 RX ORDER — MIDAZOLAM HYDROCHLORIDE 1 MG/ML
INJECTION INTRAMUSCULAR; INTRAVENOUS PRN
Status: DISCONTINUED | OUTPATIENT
Start: 2023-10-09 | End: 2023-10-09 | Stop reason: SDUPTHER

## 2023-10-09 RX ORDER — SODIUM CHLORIDE 0.9 % (FLUSH) 0.9 %
5-40 SYRINGE (ML) INJECTION EVERY 12 HOURS SCHEDULED
Status: DISCONTINUED | OUTPATIENT
Start: 2023-10-09 | End: 2023-10-09 | Stop reason: HOSPADM

## 2023-10-09 RX ORDER — LIDOCAINE HYDROCHLORIDE 20 MG/ML
INJECTION, SOLUTION INTRAVENOUS PRN
Status: DISCONTINUED | OUTPATIENT
Start: 2023-10-09 | End: 2023-10-09 | Stop reason: SDUPTHER

## 2023-10-09 RX ORDER — SODIUM CHLORIDE 0.9 % (FLUSH) 0.9 %
5-40 SYRINGE (ML) INJECTION PRN
Status: DISCONTINUED | OUTPATIENT
Start: 2023-10-09 | End: 2023-10-09 | Stop reason: HOSPADM

## 2023-10-09 RX ORDER — BUPIVACAINE HYDROCHLORIDE AND EPINEPHRINE 5; 5 MG/ML; UG/ML
INJECTION, SOLUTION EPIDURAL; INTRACAUDAL; PERINEURAL
Status: COMPLETED | OUTPATIENT
Start: 2023-10-09 | End: 2023-10-09

## 2023-10-09 RX ORDER — SODIUM CHLORIDE 9 MG/ML
INJECTION, SOLUTION INTRAVENOUS PRN
Status: DISCONTINUED | OUTPATIENT
Start: 2023-10-09 | End: 2023-10-09 | Stop reason: HOSPADM

## 2023-10-09 RX ORDER — HYDROCODONE BITARTRATE AND ACETAMINOPHEN 5; 325 MG/1; MG/1
1 TABLET ORAL EVERY 6 HOURS PRN
Qty: 12 TABLET | Refills: 0 | Status: SHIPPED | OUTPATIENT
Start: 2023-10-09 | End: 2023-10-12

## 2023-10-09 RX ADMIN — FENTANYL CITRATE 100 MCG: 50 INJECTION, SOLUTION INTRAMUSCULAR; INTRAVENOUS at 12:41

## 2023-10-09 RX ADMIN — SODIUM CHLORIDE, POTASSIUM CHLORIDE, SODIUM LACTATE AND CALCIUM CHLORIDE: 600; 310; 30; 20 INJECTION, SOLUTION INTRAVENOUS at 12:38

## 2023-10-09 RX ADMIN — ONDANSETRON 4 MG: 2 INJECTION INTRAMUSCULAR; INTRAVENOUS at 12:40

## 2023-10-09 RX ADMIN — MIDAZOLAM 2 MG: 1 INJECTION INTRAMUSCULAR; INTRAVENOUS at 12:38

## 2023-10-09 RX ADMIN — PROPOFOL 25 MG: 10 INJECTION, EMULSION INTRAVENOUS at 12:48

## 2023-10-09 RX ADMIN — CEFAZOLIN 2000 MG: 2 INJECTION, POWDER, FOR SOLUTION INTRAMUSCULAR; INTRAVENOUS at 12:38

## 2023-10-09 RX ADMIN — PROPOFOL 25 MG: 10 INJECTION, EMULSION INTRAVENOUS at 12:57

## 2023-10-09 RX ADMIN — PROPOFOL 25 MG: 10 INJECTION, EMULSION INTRAVENOUS at 12:52

## 2023-10-09 RX ADMIN — LIDOCAINE HYDROCHLORIDE 50 MG: 20 INJECTION, SOLUTION INTRAVENOUS at 12:41

## 2023-10-09 RX ADMIN — PROPOFOL 25 MG: 10 INJECTION, EMULSION INTRAVENOUS at 12:54

## 2023-10-09 RX ADMIN — PROPOFOL 50 MG: 10 INJECTION, EMULSION INTRAVENOUS at 12:41

## 2023-10-09 ASSESSMENT — PAIN - FUNCTIONAL ASSESSMENT: PAIN_FUNCTIONAL_ASSESSMENT: NONE - DENIES PAIN

## 2023-10-09 NOTE — H&P
Chief Complaint   Patient presents with    Consultation       ALONSO CTS, R>L         History of Present Illness:                             Joe Justice is a 39 y.o. female who presents today for evaluation of her bilateral right worse than left hand pain numbness and tingling. Symptoms been present for about 10 years in total.  Symptoms of gotten much more severe over the last 6 months. She has severe pain at night. Symptoms radiate out into the radial 3 digits bilateral hands right more severe than left. She has tried activity modification, rest, and bracing with no relief. She recently had an EMG and is here today for review of the results and discussion of possible surgical intervention. Patient seen in office today for:  ALONSO CTS, R>L     DOI:  n/a   DOS: n/a   Date of last injection: n/a      Patient reports 10/10 pain at night  RICE and medication are not effective to alleviate pain and reduce swelling. Pain also alleviated by: Bracing at HS        EMG performed 8/1/2023, impression below. IMPRESSION:                  1.  Abnormal EMG. There is a mild to moderate median neuropathy at the right wrist (mild to moderate right carpal tunnel syndrome). 2.  Slightly milder left carpal tunnel syndrome. 3.  No evidence of a concurrent cervical spinal nerve root lesion (radiculopathy), plexopathy, generalized neuropathy or primary muscle disease. Profession: Teacher     Left handed               Medical History  Patient's medications, allergies, past medical, surgical, social and family histories were reviewed and updated as appropriate.      Past Medical History        Past Medical History:   Diagnosis Date    Abdominal gas pain      Asthma      Back pain      Constipation      Fatigue      Kidney stone      Left flank pain      Leg pain      Pelvic pain           Past Surgical History         Past Surgical History:   Procedure Laterality Date    CYSTOSCOPY N/A

## 2023-10-09 NOTE — DISCHARGE INSTRUCTIONS
Carpal Tunnel Release  Discharge Instructions    Keep the bandage and ace wrap clean and dry for 2 days. Remove the Ace wrap and all dressings 2 days after surgery. It is okay to gently clean the hand with soap and water. OK to gently clean the incision/stitches with moist gauze. Place a large band-aid or gauze bandage over the stitches and replace daily as needed. It is okay to leave the incision open to air once there is no further drainage from the wound, but avoid dirt/contamination. Reuse the ace wrap for comfort/protection as needed. Move fingers regularly to prevent stiffness. Use ice 20 minutes at a time every 1-2 hours as needed for pain relief. It is okay to use the hand for light activities such as eating and getting dressed. No heavy gripping or direct pressure on the healing incision.

## 2023-10-09 NOTE — OP NOTE
Operative Note      Patient: Dee Han  YOB: 1980  MRN: [id number]    Date of Procedure: 10/9/2023    Pre-Op Diagnosis Codes:     * Carpal tunnel syndrome on right [G56.01]    Post-Op Diagnosis: Same       Procedure(s):  RIGHT CARPAL TUNNEL RELEASE    Surgeon(s):  Rosalia Bhatia MD    Assistant:   Physician Assistant: Dwayne Morales PA-C    Anesthesia: Monitor Anesthesia Care    Estimated Blood Loss (mL): Minimal    Complications: None    Specimens:   * No specimens in log *    Implants:  * No implants in log *      Drains: * No LDAs found *    Findings:         Detailed Description of Procedure:     DESCRIPTION OF PROCEDURE:  The patient was identified in the  preoperative area and the site was marked. The patient was taken back to the  operating room and a hand table was attached to the cart. The right  upper extremity was prepped with alcohol and then the surgical site was  injected with a mixture of 5 mL of 1% plain lidocaine and 5 mL of 0.5%  Marcaine with epinephrine. The right upper extremity was then prepped  and draped in sterile fashion with an arm tourniquet. Timeout was  performed. Preoperative antibiotics were given. The arm was  exsanguinated with an Esmarch and tourniquet inflated to 250 mmHg and  deflated at the conclusion of the case after less than 10 minutes of  tourniquet time. An incision was made overlying the carpal tunnel along the mid aspect  and dissection was carried down through subcutaneous tissues and palmar  fascia was identified and divided and retracted. This exposed the transverse  carpal ligament. The ligament was divided under direct visualization  with a 15-blade and the release was carried out distally to the level of  the palmar fat and then proximally into the distal forearm fascia. The  two ends of the transverse carpal ligament retracted nicely  revealing the healthy-appearing median nerve, which was protected  throughout the case.

## 2023-10-09 NOTE — PROGRESS NOTES
1310-Pt returned to [facility] from OR. Report received from [address] [facility] and [employer]. Vital signs stable. Pt provided with crackers and juice. Mother at bedside. Dressing to right hand clean, dry, and intact. 1339-Vital signs remain stable. Pt denies pain or nausea. Dressing to right hand clean, dry, and intact. 1343-Discharge instructions reviewed with pt and pt's mother. Verbalized understanding. IV removed, tolerated well. Pt up and getting dressed. 1403-Pt discharged home with mother. Pt stable at discharge.

## 2023-10-09 NOTE — ANESTHESIA POSTPROCEDURE EVALUATION
Department of Anesthesiology  Postprocedure Note    Patient: Joe Justice  MRN: [id number]  YOB: 1980  Date of evaluation: 10/9/2023      Procedure Summary     Date: 10/09/23 Room / Location: [address] [place] [address]    Anesthesia Start: 2367 Anesthesia Stop: 1280    Procedure: RIGHT CARPAL TUNNEL RELEASE (Right: Wrist) Diagnosis:       Carpal tunnel syndrome on right      (Carpal tunnel syndrome on right [G56.01])    Surgeons: Romayne Knows, MD Responsible Provider: Lyn Espinal MD    Anesthesia Type: MAC ASA Status: 2          Anesthesia Type: No value filed.     Prudence Phase I: Prudence Score: 10    Prudence Phase II: Prudence Score: 10      Anesthesia Post Evaluation    Patient location during evaluation: bedside  Patient participation: complete - patient participated  Level of consciousness: sleepy but conscious  Pain score: 0  Airway patency: patent  Nausea & Vomiting: no nausea and no vomiting  Complications: no  Cardiovascular status: hemodynamically stable  Respiratory status: acceptable  Hydration status: euvolemic  Pain management: adequate

## 2023-10-11 DIAGNOSIS — G89.18 POST-OP PAIN: Primary | ICD-10-CM

## 2023-10-11 RX ORDER — ONDANSETRON 4 MG/1
4 TABLET, ORALLY DISINTEGRATING ORAL 3 TIMES DAILY PRN
Qty: 15 TABLET | Refills: 0 | Status: SHIPPED | OUTPATIENT
Start: 2023-10-11 | End: 2023-10-16

## 2023-10-13 ENCOUNTER — OFFICE VISIT (OUTPATIENT)
Dept: INTERNAL MEDICINE CLINIC | Age: 43
End: 2023-10-13
Payer: COMMERCIAL

## 2023-10-13 VITALS
WEIGHT: 199 LBS | BODY MASS INDEX: 31.98 KG/M2 | DIASTOLIC BLOOD PRESSURE: 84 MMHG | HEIGHT: 66 IN | OXYGEN SATURATION: 97 % | SYSTOLIC BLOOD PRESSURE: 124 MMHG | HEART RATE: 84 BPM

## 2023-10-13 DIAGNOSIS — R06.81 APNEIC SPELL: Primary | ICD-10-CM

## 2023-10-13 DIAGNOSIS — E66.9 OBESITY (BMI 30.0-34.9): ICD-10-CM

## 2023-10-13 DIAGNOSIS — I10 HYPERTENSION, UNSPECIFIED TYPE: ICD-10-CM

## 2023-10-13 DIAGNOSIS — E04.1 THYROID NODULE: ICD-10-CM

## 2023-10-13 DIAGNOSIS — G56.02 LEFT CARPAL TUNNEL SYNDROME: ICD-10-CM

## 2023-10-13 DIAGNOSIS — G56.01 RIGHT CARPAL TUNNEL SYNDROME: ICD-10-CM

## 2023-10-13 PROCEDURE — 1036F TOBACCO NON-USER: CPT

## 2023-10-13 PROCEDURE — G8417 CALC BMI ABV UP PARAM F/U: HCPCS

## 2023-10-13 PROCEDURE — 3074F SYST BP LT 130 MM HG: CPT

## 2023-10-13 PROCEDURE — G8484 FLU IMMUNIZE NO ADMIN: HCPCS

## 2023-10-13 PROCEDURE — 99213 OFFICE O/P EST LOW 20 MIN: CPT

## 2023-10-13 PROCEDURE — G8427 DOCREV CUR MEDS BY ELIG CLIN: HCPCS

## 2023-10-13 PROCEDURE — 3078F DIAST BP <80 MM HG: CPT

## 2023-10-13 NOTE — PROGRESS NOTES
Subjective:      Chief Complaint   Patient presents with    Follow-up     4 months    Sleep Apnea     Pt got carpal tunnel surgery on monday, while under they informed she was snoring & also  stopped breathing     Other     Discuss weight     HPI:  Herman Hutchins is a 39 y.o. female who presents today for f/u chronic conditions. Had right carpal tunnel release done and has f/u next week post op then has left scheduled later this month. Already feeling better and was able to sleep on right arm for first time in a long time. Had apneic spell during surgery, recommended to have sleep study completed. Had FNA completed on thyroid nodules, negative for malignancy. Cannot lose weight, eating better, exercising more and still cannot lose weight. Past Medical History:   Diagnosis Date    Abdominal gas pain     Asthma     Back pain     Constipation     Fatigue     Hypertension 10/13/2023    Kidney stone     Left flank pain     Leg pain     Pelvic pain       Past Surgical History:   Procedure Laterality Date    CARPAL TUNNEL RELEASE Right 10/09/2023    RIGHT CARPAL TUNNEL RELEASE performed by Lucy Bkaer MD at [address] [facility] N/A 2021    CYSTOSCOPY performed by Colonel Bamberger, MD at [address] [place] [address], [facility] N/A 2021    [address] [facility] with bilateral salpingectomy     HYSTEROSCOPY      IR BIOPSY THYROID PERC CORE NEEDLE  07/10/2023    IR BIOPSY THYROID PERC CORE NEEDLE 7/10/2023 [address] [facility] SPECIAL PROCEDURES    LAPAROSCOPY N/A 2021    LAPAROSCOPY EXPLORATORY, LYSIS OF ADHESIONS, RIGHT OOPHORECTOMY performed by Lien Aaron MD at [address] [place] [address] Right 2021    TUBAL LIGATION       Social History     Tobacco Use    Smoking status: Former     Types: Cigarettes     Quit date: 2012     Years since quittin.8    Smokeless tobacco: Never   Substance Use Topics    Alcohol use:  No

## 2023-10-15 DIAGNOSIS — I10 HYPERTENSION, UNSPECIFIED TYPE: ICD-10-CM

## 2023-10-16 RX ORDER — LOSARTAN POTASSIUM AND HYDROCHLOROTHIAZIDE 25; 100 MG/1; MG/1
1 TABLET ORAL DAILY
Qty: 90 TABLET | Refills: 1 | Status: SHIPPED | OUTPATIENT
Start: 2023-10-16

## 2023-10-18 ASSESSMENT — ENCOUNTER SYMPTOMS
SHORTNESS OF BREATH: 0
EYE REDNESS: 0
DIARRHEA: 0
RHINORRHEA: 0
EYE PAIN: 0
TROUBLE SWALLOWING: 0
CHOKING: 0
STRIDOR: 0
NAUSEA: 0
COUGH: 0
FACIAL SWELLING: 0
SORE THROAT: 0
CONSTIPATION: 0
CHEST TIGHTNESS: 0
ABDOMINAL PAIN: 0
VOMITING: 0
WHEEZING: 0
EYE DISCHARGE: 0
COLOR CHANGE: 0

## 2023-10-18 ASSESSMENT — VISUAL ACUITY: OU: 1

## 2023-10-19 ENCOUNTER — OFFICE VISIT (OUTPATIENT)
Dept: ORTHOPEDIC SURGERY | Age: 43
End: 2023-10-19

## 2023-10-19 VITALS — BODY MASS INDEX: 31.98 KG/M2 | HEIGHT: 66 IN | WEIGHT: 199 LBS

## 2023-10-19 DIAGNOSIS — G56.02 LEFT CARPAL TUNNEL SYNDROME: Primary | ICD-10-CM

## 2023-10-19 PROCEDURE — 99024 POSTOP FOLLOW-UP VISIT: CPT | Performed by: ORTHOPAEDIC SURGERY

## 2023-10-19 NOTE — PATIENT INSTRUCTIONS
Avoid any heavy lifting pushing or pulling  We will schedule surgery at soonest convenience.  If you have any questions regarding your surgery please call our office and ask to speak with Simi Garcia [phone]

## 2023-10-20 ENCOUNTER — ANESTHESIA EVENT (OUTPATIENT)
Dept: OPERATING ROOM | Age: 43
End: 2023-10-20
Payer: COMMERCIAL

## 2023-10-20 NOTE — PROGRESS NOTES
Patient notified of surgery time at [facility] 10/23/23 1530 arrival 1330, NPO instructions and DOS meds reviewed

## 2023-10-23 ENCOUNTER — HOSPITAL ENCOUNTER (OUTPATIENT)
Age: 43
Setting detail: OUTPATIENT SURGERY
Discharge: HOME | End: 2023-10-23
Attending: ORTHOPAEDIC SURGERY | Admitting: ORTHOPAEDIC SURGERY
Payer: COMMERCIAL

## 2023-10-23 ENCOUNTER — ANESTHESIA (OUTPATIENT)
Dept: OPERATING ROOM | Age: 43
End: 2023-10-23
Payer: COMMERCIAL

## 2023-10-23 VITALS
BODY MASS INDEX: 31.98 KG/M2 | HEART RATE: 68 BPM | TEMPERATURE: 96.8 F | HEIGHT: 66 IN | SYSTOLIC BLOOD PRESSURE: 128 MMHG | WEIGHT: 199 LBS | OXYGEN SATURATION: 99 % | RESPIRATION RATE: 16 BRPM | DIASTOLIC BLOOD PRESSURE: 91 MMHG

## 2023-10-23 DIAGNOSIS — G56.02 LEFT CARPAL TUNNEL SYNDROME: Primary | ICD-10-CM

## 2023-10-23 PROCEDURE — 6360000002 HC RX W HCPCS

## 2023-10-23 PROCEDURE — 2709999900 HC NON-CHARGEABLE SUPPLY: Performed by: ORTHOPAEDIC SURGERY

## 2023-10-23 PROCEDURE — 7100000010 HC PHASE II RECOVERY - FIRST 15 MIN: Performed by: ORTHOPAEDIC SURGERY

## 2023-10-23 PROCEDURE — 2580000003 HC RX 258: Performed by: ORTHOPAEDIC SURGERY

## 2023-10-23 PROCEDURE — 3600000002 HC SURGERY LEVEL 2 BASE: Performed by: ORTHOPAEDIC SURGERY

## 2023-10-23 PROCEDURE — 2500000003 HC RX 250 WO HCPCS: Performed by: ORTHOPAEDIC SURGERY

## 2023-10-23 PROCEDURE — 2580000003 HC RX 258: Performed by: ANESTHESIOLOGY

## 2023-10-23 PROCEDURE — 3700000001 HC ADD 15 MINUTES (ANESTHESIA): Performed by: ORTHOPAEDIC SURGERY

## 2023-10-23 PROCEDURE — 3700000000 HC ANESTHESIA ATTENDED CARE: Performed by: ORTHOPAEDIC SURGERY

## 2023-10-23 PROCEDURE — 3600000012 HC SURGERY LEVEL 2 ADDTL 15MIN: Performed by: ORTHOPAEDIC SURGERY

## 2023-10-23 PROCEDURE — 6360000002 HC RX W HCPCS: Performed by: ORTHOPAEDIC SURGERY

## 2023-10-23 PROCEDURE — 7100000011 HC PHASE II RECOVERY - ADDTL 15 MIN: Performed by: ORTHOPAEDIC SURGERY

## 2023-10-23 RX ORDER — ONDANSETRON 4 MG/1
4 TABLET, FILM COATED ORAL 3 TIMES DAILY PRN
Qty: 10 TABLET | Refills: 0 | Status: SHIPPED | OUTPATIENT
Start: 2023-10-23

## 2023-10-23 RX ORDER — SODIUM CHLORIDE 9 MG/ML
INJECTION, SOLUTION INTRAVENOUS PRN
Status: DISCONTINUED | OUTPATIENT
Start: 2023-10-23 | End: 2023-10-23 | Stop reason: HOSPADM

## 2023-10-23 RX ORDER — PROPOFOL 10 MG/ML
INJECTION, EMULSION INTRAVENOUS CONTINUOUS PRN
Status: DISCONTINUED | OUTPATIENT
Start: 2023-10-23 | End: 2023-10-23 | Stop reason: SDUPTHER

## 2023-10-23 RX ORDER — HYDROCODONE BITARTRATE AND ACETAMINOPHEN 5; 325 MG/1; MG/1
1 TABLET ORAL EVERY 6 HOURS PRN
Qty: 12 TABLET | Refills: 0 | Status: SHIPPED | OUTPATIENT
Start: 2023-10-23 | End: 2023-10-26

## 2023-10-23 RX ORDER — FENTANYL CITRATE 50 UG/ML
INJECTION, SOLUTION INTRAMUSCULAR; INTRAVENOUS PRN
Status: DISCONTINUED | OUTPATIENT
Start: 2023-10-23 | End: 2023-10-23 | Stop reason: SDUPTHER

## 2023-10-23 RX ORDER — SODIUM CHLORIDE, SODIUM LACTATE, POTASSIUM CHLORIDE, CALCIUM CHLORIDE 600; 310; 30; 20 MG/100ML; MG/100ML; MG/100ML; MG/100ML
INJECTION, SOLUTION INTRAVENOUS CONTINUOUS
Status: DISCONTINUED | OUTPATIENT
Start: 2023-10-23 | End: 2023-10-23 | Stop reason: HOSPADM

## 2023-10-23 RX ORDER — SODIUM CHLORIDE 0.9 % (FLUSH) 0.9 %
5-40 SYRINGE (ML) INJECTION PRN
Status: DISCONTINUED | OUTPATIENT
Start: 2023-10-23 | End: 2023-10-23 | Stop reason: HOSPADM

## 2023-10-23 RX ORDER — SODIUM CHLORIDE 0.9 % (FLUSH) 0.9 %
5-40 SYRINGE (ML) INJECTION EVERY 12 HOURS SCHEDULED
Status: DISCONTINUED | OUTPATIENT
Start: 2023-10-23 | End: 2023-10-23 | Stop reason: HOSPADM

## 2023-10-23 RX ADMIN — SODIUM CHLORIDE, POTASSIUM CHLORIDE, SODIUM LACTATE AND CALCIUM CHLORIDE: 600; 310; 30; 20 INJECTION, SOLUTION INTRAVENOUS at 13:59

## 2023-10-23 RX ADMIN — FENTANYL CITRATE 50 MCG: 50 INJECTION, SOLUTION INTRAMUSCULAR; INTRAVENOUS at 15:12

## 2023-10-23 RX ADMIN — CEFAZOLIN 2000 MG: 2 INJECTION, POWDER, FOR SOLUTION INTRAMUSCULAR; INTRAVENOUS at 15:15

## 2023-10-23 RX ADMIN — FENTANYL CITRATE 50 MCG: 50 INJECTION, SOLUTION INTRAMUSCULAR; INTRAVENOUS at 15:18

## 2023-10-23 RX ADMIN — PROPOFOL 100 MCG/KG/MIN: 10 INJECTION, EMULSION INTRAVENOUS at 15:12

## 2023-10-23 ASSESSMENT — PAIN SCALES - GENERAL
PAINLEVEL_OUTOF10: 0
PAINLEVEL_OUTOF10: 0

## 2023-10-23 ASSESSMENT — ENCOUNTER SYMPTOMS
EYE PAIN: 0
CHEST TIGHTNESS: 0
COLOR CHANGE: 0
VOMITING: 0
WHEEZING: 0
EYE REDNESS: 0
SHORTNESS OF BREATH: 0

## 2023-10-23 ASSESSMENT — PAIN - FUNCTIONAL ASSESSMENT: PAIN_FUNCTIONAL_ASSESSMENT: 0-10

## 2023-10-23 NOTE — OP NOTE
Operative Note      Patient: Kateryna Ibanez  YOB: 1980  MRN: [id number]    Date of Procedure: 10/23/2023    Pre-Op Diagnosis Codes:     * Carpal tunnel syndrome on left [G56.02]    Post-Op Diagnosis: Same       Procedure(s):  LEFT CARPAL TUNNEL RELEASE    Surgeon(s):  Munir Velazquez MD    Assistant:   Physician Assistant: Irina Angel PA-C    Anesthesia: Monitor Anesthesia Care    Estimated Blood Loss (mL): Minimal    Complications: None    Specimens:   * No specimens in log *    Implants:  * No implants in log *      Drains: * No LDAs found *    Findings:          Detailed Description of Procedure:        DESCRIPTION OF PROCEDURE:  The patient was identified in the  preoperative area and the site was marked. The patient was taken back to the  operating room and a hand table was attached to the cart. The left  upper extremity was prepped with alcohol and then the surgical site was  injected with a mixture of 5 mL of 1% plain lidocaine and 5 mL of 0.5%  Marcaine with epinephrine. The left upper extremity was then prepped  and draped in sterile fashion with an arm tourniquet. Timeout was  performed. Preoperative antibiotics were given. The arm was  exsanguinated with an Esmarch and tourniquet inflated to 250 mmHg and  deflated at the conclusion of the case after less than 10 minutes of  tourniquet time. An incision was made overlying the carpal tunnel along the mid aspect  and dissection was carried down through subcutaneous tissues and palmar  fascia was identified and divided and retracted. This exposed the transverse  carpal ligament. The ligament was divided under direct visualization  with a 15-blade and the release was carried out distally to the level of  the palmar fat and then proximally into the distal forearm fascia. The  two ends of the transverse carpal ligament retracted nicely  revealing the healthy-appearing median nerve, which was protected  throughout the case.

## 2023-10-23 NOTE — ANESTHESIA POSTPROCEDURE EVALUATION
Department of Anesthesiology  Postprocedure Note    Patient: Ita Parmar  MRN: [id number]  YOB: 1980  Date of evaluation: 10/23/2023      Procedure Summary     Date: 10/23/23 Room / Location: [address] [place] [address]    Anesthesia Start: 6145 Anesthesia Stop: 7904    Procedure: LEFT CARPAL TUNNEL RELEASE (Left: Hand) Diagnosis:       Carpal tunnel syndrome on left      (Carpal tunnel syndrome on left [G56.02])    Surgeons: Radha Fritz MD Responsible Provider: Fiorella Whipple MD    Anesthesia Type: MAC ASA Status: 2          Anesthesia Type: No value filed.     Prudence Phase I: Prudence Score: 10    Prudence Phase II: Prudence Score: 10      Anesthesia Post Evaluation    Patient location during evaluation: bedside  Patient participation: complete - patient participated  Level of consciousness: awake  Pain score: 0  Airway patency: patent  Nausea & Vomiting: no nausea and no vomiting  Complications: no  Cardiovascular status: blood pressure returned to baseline and hemodynamically stable  Respiratory status: acceptable and room air  Hydration status: euvolemic  Pain management: adequate

## 2023-10-23 NOTE — PROGRESS NOTES
1551 Pt. Brought back to unit, bedside report received from [address] [facility], [address]. Pt. Surgical site is C/D/I. Pt. Denies pain at site and able to wiggle fingers appropriately. 2415 Pt. Provided with beverage and pt has family at bedside. 3[address] serve sent to Dr. Vale Del Valle. 36 DC instructions reviewed with pt and family, all parties express understanding. 1640 Pt taken down to DC home with family.

## 2023-11-02 ENCOUNTER — OFFICE VISIT (OUTPATIENT)
Dept: ORTHOPEDIC SURGERY | Age: 43
End: 2023-11-02

## 2023-11-02 VITALS
HEART RATE: 68 BPM | WEIGHT: 198 LBS | HEIGHT: 66 IN | BODY MASS INDEX: 31.82 KG/M2 | RESPIRATION RATE: 18 BRPM | OXYGEN SATURATION: 98 %

## 2023-11-02 DIAGNOSIS — Z98.890 STATUS POST CARPAL TUNNEL RELEASE OF BOTH WRISTS: Primary | ICD-10-CM

## 2023-11-02 PROCEDURE — 99024 POSTOP FOLLOW-UP VISIT: CPT

## 2023-11-02 NOTE — PROGRESS NOTES
Patient returns to the office with a left CTR DOS: 10/23/23. Pt is overall doing well and stated that her numbness and tingling has completely subsided in both hands. Today she has noticed her left hand is getting more achy as she has increased activities. Pt stated that she will take an OTC medication as needed. Incision was clean and intact with no complications.

## 2023-11-02 NOTE — PATIENT INSTRUCTIONS
Sutures removed today. May clean the incision with soap and water. Do not submerge incision until fully closed. OTC medication and ice as needed for pain. Follow up in 4 weeks if needed.

## 2023-11-03 ASSESSMENT — ENCOUNTER SYMPTOMS
COUGH: 0
BACK PAIN: 0
NAUSEA: 0
SHORTNESS OF BREATH: 0
FACIAL SWELLING: 0
RHINORRHEA: 0

## 2023-11-03 NOTE — PROGRESS NOTES
11/2/2023   Chief Complaint   Patient presents with    Post-Op Check     Left CTR DOS: 10/23/23        History of Present Illness:                             Rosie Kate is a 39 y.o. female returning to the office today for continued management of bilateral carpal tunnel release. Patient states that she has done well with her healing process overall and that her numbness and tingling has completely resolved in both hands. Patient states that she has noticed some increased tenderness in the healed areas of her incision. She denies any signs of infection such as erythema, fluctuance, drainage, or surrounding temperature changes. She states she has full mobility of her wrist, hand, and fingers. She is overall happy with her progress. Patient returns to the office with a left CTR DOS: 10/23/23. Pt is overall doing well and stated that her numbness and tingling has completely subsided in both hands. Today she has noticed her left hand is getting more achy as she has increased activities. Pt stated that she will take an OTC medication as needed. Incision was clean and intact with no complications. Medical History  Patient's medications, allergies, past medical, surgical, social and family histories were reviewed and updated as appropriate. Review of Systems   Constitutional:  Negative for fever. HENT:  Negative for facial swelling and rhinorrhea. Respiratory:  Negative for cough and shortness of breath. Cardiovascular:  Negative for chest pain. Gastrointestinal:  Negative for nausea. Musculoskeletal:  Positive for arthralgias. Negative for back pain, gait problem, joint swelling, myalgias, neck pain and neck stiffness. Skin:  Negative for pallor and rash. Neurological:  Negative for facial asymmetry and speech difficulty. Psychiatric/Behavioral:  Negative for agitation and confusion.                                                 Examination:  General Exam:  Vitals: Pulse

## 2023-11-17 ENCOUNTER — HOSPITAL ENCOUNTER (OUTPATIENT)
Dept: SLEEP CENTER | Age: 43
Discharge: HOME | End: 2023-11-17
Payer: COMMERCIAL

## 2023-11-17 VITALS
OXYGEN SATURATION: 97 % | SYSTOLIC BLOOD PRESSURE: 129 MMHG | DIASTOLIC BLOOD PRESSURE: 77 MMHG | HEIGHT: 66 IN | WEIGHT: 198 LBS | HEART RATE: 73 BPM | BODY MASS INDEX: 31.82 KG/M2

## 2023-11-17 DIAGNOSIS — R06.83 SNORING: ICD-10-CM

## 2023-11-17 DIAGNOSIS — G47.10 HYPERSOMNOLENCE: Primary | ICD-10-CM

## 2023-11-17 PROCEDURE — 99211 OFF/OP EST MAY X REQ PHY/QHP: CPT

## 2023-11-17 ASSESSMENT — SLEEP AND FATIGUE QUESTIONNAIRES
HOW LIKELY ARE YOU TO NOD OFF OR FALL ASLEEP WHILE WATCHING TV: 2
HOW LIKELY ARE YOU TO NOD OFF OR FALL ASLEEP WHILE SITTING QUIETLY AFTER LUNCH WITHOUT ALCOHOL: 1
HOW LIKELY ARE YOU TO NOD OFF OR FALL ASLEEP WHILE SITTING AND TALKING TO SOMEONE: 0
HOW LIKELY ARE YOU TO NOD OFF OR FALL ASLEEP WHILE LYING DOWN TO REST IN THE AFTERNOON WHEN CIRCUMSTANCES PERMIT: 3
HOW LIKELY ARE YOU TO NOD OFF OR FALL ASLEEP WHILE SITTING INACTIVE IN A PUBLIC PLACE: 0
ESS TOTAL SCORE: 8
HOW LIKELY ARE YOU TO NOD OFF OR FALL ASLEEP WHEN YOU ARE A PASSENGER IN A CAR FOR AN HOUR WITHOUT A BREAK: 1
HOW LIKELY ARE YOU TO NOD OFF OR FALL ASLEEP WHILE SITTING AND READING: 1
HOW LIKELY ARE YOU TO NOD OFF OR FALL ASLEEP IN A CAR, WHILE STOPPED FOR A FEW MINUTES IN TRAFFIC: 0
NECK CIRCUMFERENCE (INCHES): 15.5

## 2023-11-17 NOTE — PROGRESS NOTES
Nicholas HASKINS, Smith Pierson MD, [address] [facility] MD, [address] [place] [address] Avenue DO       W. Ambrose Werner. [address], [address] [address] [place]: [phone]  F: [phone]     Subjective:     Patient ID: Shara Brian is a 39 y.o. female, referred to the sleep center for consultation with a sleep specialist.     Reason for Consultation/Chief Complaint:   Chief Complaint   Patient presents with    Apnea       Referring physician:   Roland LIMON-CNP    Symptoms:   [x]  Snoring                                                               [x]  Dry Mouth  []  Choking                                                              [x]  Morning Headaches  []  Gasping for Air                                                   []  Trouble Falling asleep  [x]  Tired during the daytime                                    [x]  Trouble Staying Asleep  [x]  Tired when you wake up                                    [x]  Weight Gain in Last 5 Years  [x]  Wake up frequently at night                              []  Weight Loss in Last 5 Years  []  Shortness Of Breath                                          []  Shift Worker  []  Coughing                                                            [x]  Smoker (Previous or Current)  []  Chest Pain                                                          []  Anxiety  []  Trouble keeping your legs still at night              [x]  Depression  []  Kicking your legs in your sleep                         []  Insomnia            []  Other:     Significant Co-morbidities:  []  Congestive Heart Failure     []  COPD         []  Stroke (Past 30 Days)      []  Supplemental Oxygen Usage       []  Cognitive Impairment      []  Neuromuscular Problems  []  Epilepsy/Neurological Disorders         Duration of Sleep Problems:    History:    Social History     Socioeconomic

## 2023-11-29 PROBLEM — E66.811 OBESITY (BMI 30.0-34.9): Status: ACTIVE | Noted: 2023-11-29

## 2023-11-29 PROBLEM — E78.00 PURE HYPERCHOLESTEROLEMIA: Status: ACTIVE | Noted: 2023-11-29

## 2023-11-29 PROBLEM — E66.9 OBESITY (BMI 30.0-34.9): Status: ACTIVE | Noted: 2023-11-29

## 2023-11-29 PROBLEM — E55.9 VITAMIN D DEFICIENCY: Status: ACTIVE | Noted: 2023-11-29

## 2023-12-29 ENCOUNTER — HOSPITAL ENCOUNTER (OUTPATIENT)
Dept: SLEEP CENTER | Age: 43
Discharge: HOME | End: 2023-12-29
Payer: COMMERCIAL

## 2023-12-29 DIAGNOSIS — G47.33 OSA (OBSTRUCTIVE SLEEP APNEA): Primary | ICD-10-CM

## 2023-12-29 PROCEDURE — 99211 OFF/OP EST MAY X REQ PHY/QHP: CPT

## 2023-12-29 NOTE — PROGRESS NOTES
Nicholas HASKINS, Yun Rodriguez MD, [address] [facility] MD, [address] [place] [address] Avenue DO       WMariano Redman. [address] [facility], [address] [address] [place]: [phone]  F: [phone]     Subjective:     Patient ID: Rupa Bernard is a 39 y.o. female, following up today with the sleep center. Reason for Follow Up/Chief Complaint:   Chief Complaint   Patient presents with    hypersomnia    Snoring       Results:Mild to moderate JUANCARLOS. AHI 8. RDI 22. History: EDS. Social History     Socioeconomic History    Marital status: Single     Spouse name: Not on file    Number of children: Not on file    Years of education: Not on file    Highest education level: Not on file   Occupational History    Not on file   Tobacco Use    Smoking status: Former     Current packs/day: 0.00     Types: Cigarettes     Quit date: 2012     Years since quittin.9    Smokeless tobacco: Never   Vaping Use    Vaping Use: Never used   Substance and Sexual Activity    Alcohol use: No    Drug use: No    Sexual activity: Not Currently     Partners: Male   Other Topics Concern    Not on file   Social History Narrative    Not on file     Social Determinants of Health     Financial Resource Strain: Low Risk  (2023)    Overall Financial Resource Strain (CARDIA)     Difficulty of Paying Living Expenses: Not hard at all   Food Insecurity: Not on file (2023)   Recent Concern: [address] Present (2023)    Hunger Vital Sign     Worried About [place] in the Last Year: Sometimes true     Ran Out of Food in the Last Year: Sometimes true   Transportation Needs: Unknown (2023)    PRAPARE - Transportation     Lack of Transportation (Medical): Not on file     Lack of Transportation (Non-Medical):  No   Physical Activity: Not on file   Stress: Not on file   Social Connections: Not on file   Intimate Partner Violence: Not

## 2024-01-24 ENCOUNTER — OFFICE VISIT MH/BH (OUTPATIENT)
Dept: BARIATRICS/WEIGHT MGMT | Age: 44
End: 2024-01-24
Payer: COMMERCIAL

## 2024-01-24 VITALS
DIASTOLIC BLOOD PRESSURE: 80 MMHG | OXYGEN SATURATION: 100 % | SYSTOLIC BLOOD PRESSURE: 120 MMHG | BODY MASS INDEX: 32.08 KG/M2 | HEART RATE: 74 BPM | WEIGHT: 199.6 LBS | HEIGHT: 66 IN

## 2024-01-24 DIAGNOSIS — Z79.899 MEDICATION MANAGEMENT: ICD-10-CM

## 2024-01-24 DIAGNOSIS — E66.9 OBESITY (BMI 30.0-34.9): Primary | ICD-10-CM

## 2024-01-24 LAB
ALCOHOL URINE: NORMAL
AMPHETAMINE SCREEN, URINE: NEGATIVE
BARBITURATE SCREEN, URINE: NEGATIVE
BENZODIAZEPINE SCREEN, URINE: NEGATIVE
BUPRENORPHINE URINE: NEGATIVE
COCAINE METABOLITE SCREEN URINE: NEGATIVE
FENTANYL SCREEN, URINE: NORMAL
GABAPENTIN SCREEN, URINE: NORMAL
MDMA URINE: NEGATIVE
METHADONE SCREEN, URINE: NEGATIVE
METHAMPHETAMINE, URINE: NEGATIVE
OPIATE SCREEN URINE: NEGATIVE
OXYCODONE SCREEN URINE: NEGATIVE
PHENCYCLIDINE SCREEN URINE: NEGATIVE
PROPOXYPHENE SCREEN, URINE: NEGATIVE
SYNTHETIC CANNABINOIDS(K2) SCREEN, URINE: NORMAL
THC SCREEN, URINE: NEGATIVE
TRAMADOL SCREEN URINE: NORMAL
TRICYCLIC ANTIDEPRESSANTS, UR: NEGATIVE

## 2024-01-24 PROCEDURE — G8484 FLU IMMUNIZE NO ADMIN: HCPCS | Performed by: NURSE PRACTITIONER

## 2024-01-24 PROCEDURE — G8417 CALC BMI ABV UP PARAM F/U: HCPCS | Performed by: NURSE PRACTITIONER

## 2024-01-24 PROCEDURE — 80305 DRUG TEST PRSMV DIR OPT OBS: CPT | Performed by: NURSE PRACTITIONER

## 2024-01-24 PROCEDURE — 3079F DIAST BP 80-89 MM HG: CPT | Performed by: NURSE PRACTITIONER

## 2024-01-24 PROCEDURE — 3074F SYST BP LT 130 MM HG: CPT | Performed by: NURSE PRACTITIONER

## 2024-01-24 PROCEDURE — G8427 DOCREV CUR MEDS BY ELIG CLIN: HCPCS | Performed by: NURSE PRACTITIONER

## 2024-01-24 PROCEDURE — 99204 OFFICE O/P NEW MOD 45 MIN: CPT | Performed by: NURSE PRACTITIONER

## 2024-01-24 PROCEDURE — 1036F TOBACCO NON-USER: CPT | Performed by: NURSE PRACTITIONER

## 2024-01-24 RX ORDER — PHENTERMINE HYDROCHLORIDE 37.5 MG/1
37.5 TABLET ORAL
Qty: 30 TABLET | Refills: 0 | Status: SHIPPED | OUTPATIENT
Start: 2024-01-24 | End: 2024-02-23

## 2024-01-24 NOTE — PROGRESS NOTES
10/13/2023    Kidney stone     Left flank pain     Leg pain     Pelvic pain     Pure hypercholesterolemia 2023       Family History   Problem Relation Age of Onset    High Blood Pressure Mother     Other Father         Lupus    Breast Cancer Neg Hx        Social History     Socioeconomic History    Marital status: Single     Spouse name: Not on file    Number of children: Not on file    Years of education: Not on file    Highest education level: Not on file   Occupational History    Not on file   Tobacco Use    Smoking status: Former     Current packs/day: 0.00     Types: Cigarettes     Quit date: 2012     Years since quittin.0    Smokeless tobacco: Never   Vaping Use    Vaping Use: Never used   Substance and Sexual Activity    Alcohol use: No    Drug use: No    Sexual activity: Not Currently     Partners: Male   Other Topics Concern    Not on file   Social History Narrative    Not on file     Social Determinants of Health     Financial Resource Strain: Low Risk  (2023)    Overall Financial Resource Strain (CARDIA)     Difficulty of Paying Living Expenses: Not hard at all   Food Insecurity: Not on file (2023)   Recent Concern: Food Insecurity - Food Insecurity Present (2023)    Hunger Vital Sign     Worried About Running Out of Food in the Last Year: Sometimes true     Ran Out of Food in the Last Year: Sometimes true   Transportation Needs: Unknown (2023)    PRAPARE - Transportation     Lack of Transportation (Medical): Not on file     Lack of Transportation (Non-Medical): No   Physical Activity: Not on file   Stress: Not on file   Social Connections: Not on file   Intimate Partner Violence: Not on file   Housing Stability: Unknown (2023)    Housing Stability Vital Sign     Unable to Pay for Housing in the Last Year: Not on file     Number of Places Lived in the Last Year: Not on file     Unstable Housing in the Last Year: No       Current Outpatient Medications   Medication Sig

## 2024-02-22 ENCOUNTER — OFFICE VISIT (OUTPATIENT)
Dept: BARIATRICS/WEIGHT MGMT | Age: 44
End: 2024-02-22
Payer: COMMERCIAL

## 2024-02-22 VITALS
HEART RATE: 104 BPM | DIASTOLIC BLOOD PRESSURE: 78 MMHG | HEIGHT: 66 IN | BODY MASS INDEX: 31.1 KG/M2 | WEIGHT: 193.5 LBS | OXYGEN SATURATION: 96 % | SYSTOLIC BLOOD PRESSURE: 114 MMHG

## 2024-02-22 DIAGNOSIS — Z79.899 MEDICATION MANAGEMENT: ICD-10-CM

## 2024-02-22 DIAGNOSIS — E66.9 OBESITY (BMI 30.0-34.9): Primary | ICD-10-CM

## 2024-02-22 PROCEDURE — 3078F DIAST BP <80 MM HG: CPT | Performed by: NURSE PRACTITIONER

## 2024-02-22 PROCEDURE — G8427 DOCREV CUR MEDS BY ELIG CLIN: HCPCS | Performed by: NURSE PRACTITIONER

## 2024-02-22 PROCEDURE — G8484 FLU IMMUNIZE NO ADMIN: HCPCS | Performed by: NURSE PRACTITIONER

## 2024-02-22 PROCEDURE — 3074F SYST BP LT 130 MM HG: CPT | Performed by: NURSE PRACTITIONER

## 2024-02-22 PROCEDURE — G8417 CALC BMI ABV UP PARAM F/U: HCPCS | Performed by: NURSE PRACTITIONER

## 2024-02-22 PROCEDURE — 1036F TOBACCO NON-USER: CPT | Performed by: NURSE PRACTITIONER

## 2024-02-22 PROCEDURE — 99214 OFFICE O/P EST MOD 30 MIN: CPT | Performed by: NURSE PRACTITIONER

## 2024-02-22 RX ORDER — PHENTERMINE HYDROCHLORIDE 37.5 MG/1
37.5 TABLET ORAL
Qty: 30 TABLET | Refills: 0 | Status: SHIPPED | OUTPATIENT
Start: 2024-02-22 | End: 2024-03-23

## 2024-02-22 NOTE — PROGRESS NOTES
Chief Complaint   Patient presents with    Weight Management     F/U Medication WM Visit - Adipex #2  Has Bar Cov         SUBJECTIVE:    HPI: Patient is here with complaints of: Monthly Adipex visit.    Obesity with a BMI of Body mass index is 31.71 kg/m²..    Current weight: 193 pounds    Weight change since last visit: -6.1 pounds (if pt failed to lose weight, cannot remain on medication).    Month 2 of being on Adipex.     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          Past Surgical History:   Procedure Laterality Date    CARPAL TUNNEL RELEASE Right 10/09/2023    RIGHT CARPAL TUNNEL RELEASE performed by Javi Arroyo MD at [facility] OR    CARPAL TUNNEL RELEASE Left 10/23/2023    LEFT CARPAL TUNNEL RELEASE performed by Javi Arroyo MD at [facility] OR    CYSTOSCOPY N/A 02/16/2021    CYSTOSCOPY performed by Deny Louis MD at [facility] OR    DILATION AND CURETTAGE      ENDOMETRIAL ABLATION      HERNIA REPAIR      HYSTERECTOMY, VAGINAL N/A 02/16/2021    [facility] with bilateral salpingectomy     HYSTEROSCOPY      IR BIOPSY THYROID PERC CORE NEEDLE  07/10/2023    IR BIOPSY THYROID PERC CORE NEEDLE 7/10/2023 [facility] SPECIAL PROCEDURES    LAPAROSCOPY N/A 09/22/2021    LAPAROSCOPY EXPLORATORY, LYSIS OF ADHESIONS, RIGHT OOPHORECTOMY performed by Hayder Ball MD at [facility] OR    OVARY REMOVAL Right

## 2024-03-07 ENCOUNTER — OFFICE VISIT (OUTPATIENT)
Dept: BARIATRICS/WEIGHT MGMT | Age: 44
End: 2024-03-07

## 2024-03-07 VITALS — BODY MASS INDEX: 31.26 KG/M2 | WEIGHT: 194.5 LBS | HEIGHT: 66 IN

## 2024-03-07 DIAGNOSIS — E66.9 OBESITY (BMI 30.0-34.9): Primary | ICD-10-CM

## 2024-03-07 NOTE — PROGRESS NOTES
Outpatient Nutrition Counseling - Medication Weight Loss Program    REASON FOR VISIT:    Chief Complaint:    Chief Complaint   Patient presents with    Weight Management       SUBJECTIVE:  Pt here for nutrition consult in medication program.  The patient is a 43 y.o. female being seen for obesity, enrolled in Medication Weight Loss Program; Jaya's, Height: 166.4 cm (5' 5.5\"), Weight - Scale: 88.2 kg (194 lb 8 oz), Current Body mass index is 31.87 kg/m².  patient's PCP is Tanya Mock APRN - CNP     Comorbid Conditions:  Significant diseases affecting this patient are   Past Medical History:   Diagnosis Date    Abdominal gas pain     Asthma     Back pain     Constipation     Fatigue     Hypertension 10/13/2023    Kidney stone     Left flank pain     Leg pain     Pelvic pain     Pure hypercholesterolemia 11/29/2023   .    Review of Systems - Review of Systems  Otherwise per HPI.    Allergies:  No Known Allergies    Past Surgical History:  Past Surgical History:   Procedure Laterality Date    CARPAL TUNNEL RELEASE Right 10/09/2023    RIGHT CARPAL TUNNEL RELEASE performed by Javi Arroyo MD at [facility] OR    CARPAL TUNNEL RELEASE Left 10/23/2023    LEFT CARPAL TUNNEL RELEASE performed by Javi Arroyo MD at [facility] OR    CYSTOSCOPY N/A 02/16/2021    CYSTOSCOPY performed by Deny Louis MD at [facility] OR    DILATION AND CURETTAGE      ENDOMETRIAL ABLATION      HERNIA REPAIR      HYSTERECTOMY, VAGINAL N/A 02/16/2021    [facility] with bilateral salpingectomy     HYSTEROSCOPY      IR BIOPSY THYROID PERC CORE NEEDLE  07/10/2023    IR BIOPSY THYROID PERC CORE NEEDLE 7/10/2023 [facility] SPECIAL PROCEDURES    LAPAROSCOPY N/A 09/22/2021    LAPAROSCOPY EXPLORATORY, LYSIS OF ADHESIONS, RIGHT OOPHORECTOMY performed by Hayder Ball MD at [facility] OR    OVARY REMOVAL Right 09/22/2021    TUBAL LIGATION         Family History:  Family History   Problem Relation Age of Onset    High Blood Pressure Mother     Other Father         Lupus

## 2024-03-18 ENCOUNTER — OFFICE VISIT (OUTPATIENT)
Dept: BARIATRICS/WEIGHT MGMT | Age: 44
End: 2024-03-18
Payer: COMMERCIAL

## 2024-03-18 VITALS
WEIGHT: 194.3 LBS | DIASTOLIC BLOOD PRESSURE: 84 MMHG | OXYGEN SATURATION: 96 % | SYSTOLIC BLOOD PRESSURE: 130 MMHG | HEIGHT: 66 IN | BODY MASS INDEX: 31.23 KG/M2 | HEART RATE: 94 BPM

## 2024-03-18 DIAGNOSIS — Z79.899 MEDICATION MANAGEMENT: ICD-10-CM

## 2024-03-18 DIAGNOSIS — E66.9 OBESITY (BMI 30.0-34.9): Primary | ICD-10-CM

## 2024-03-18 PROCEDURE — 3075F SYST BP GE 130 - 139MM HG: CPT | Performed by: NURSE PRACTITIONER

## 2024-03-18 PROCEDURE — 3079F DIAST BP 80-89 MM HG: CPT | Performed by: NURSE PRACTITIONER

## 2024-03-18 PROCEDURE — G8484 FLU IMMUNIZE NO ADMIN: HCPCS | Performed by: NURSE PRACTITIONER

## 2024-03-18 PROCEDURE — 1036F TOBACCO NON-USER: CPT | Performed by: NURSE PRACTITIONER

## 2024-03-18 PROCEDURE — 99214 OFFICE O/P EST MOD 30 MIN: CPT | Performed by: NURSE PRACTITIONER

## 2024-03-18 PROCEDURE — G8427 DOCREV CUR MEDS BY ELIG CLIN: HCPCS | Performed by: NURSE PRACTITIONER

## 2024-03-18 PROCEDURE — G8417 CALC BMI ABV UP PARAM F/U: HCPCS | Performed by: NURSE PRACTITIONER

## 2024-03-18 RX ORDER — PHENTERMINE HYDROCHLORIDE 37.5 MG/1
37.5 TABLET ORAL
Qty: 30 TABLET | Refills: 0 | Status: SHIPPED | OUTPATIENT
Start: 2024-03-18 | End: 2024-04-17

## 2024-03-18 NOTE — PROGRESS NOTES
in the Last Year: Not on file     Unstable Housing in the Last Year: No       Current Outpatient Medications   Medication Sig Dispense Refill    phentermine (ADIPEX-P) 37.5 MG tablet Take 1 tablet by mouth every morning (before breakfast) for 30 days. BMI 44. Max Daily Amount: 37.5 mg 30 tablet 0    phentermine (ADIPEX-P) 37.5 MG tablet Take 1 tablet by mouth every morning (before breakfast) for 30 days. BMI 44. Max Daily Amount: 37.5 mg 30 tablet 0    losartan-hydroCHLOROthiazide (HYZAAR) 100-25 MG per tablet TAKE 1 TABLET BY MOUTH DAILY 90 tablet 1    buPROPion (WELLBUTRIN XL) 300 MG extended release tablet Take 1 tablet by mouth every morning 90 tablet 1    albuterol sulfate HFA (PROVENTIL;VENTOLIN;PROAIR) 108 (90 Base) MCG/ACT inhaler Inhale 2 puffs into the lungs every 4 hours as needed for Wheezing 18 g 2    vitamin D3 (CHOLECALCIFEROL) 25 MCG (1000 UT) TABS tablet Take 1 tablet by mouth daily 90 tablet 1    ibuprofen (ADVIL;MOTRIN) 800 MG tablet Take 1 tablet by mouth every 8 hours as needed for Pain 60 tablet 2     No current facility-administered medications for this visit.        No Known Allergies    Review of Systems:         Review of Systems   All other systems reviewed and are negative.        OBJECTIVE:  Physical Exam:    /84   Pulse 94   Ht 1.664 m (5' 5.5\")   Wt 88.1 kg (194 lb 4.8 oz)   LMP 02/07/2021   SpO2 96%   BMI 31.84 kg/m²      Physical Exam  Constitutional:       Appearance: Normal appearance. She is obese.   HENT:      Head: Normocephalic.      Nose: Nose normal.      Mouth/Throat:      Pharynx: Oropharynx is clear.   Eyes:      Pupils: Pupils are equal, round, and reactive to light.   Cardiovascular:      Rate and Rhythm: Normal rate.      Pulses: Normal pulses.   Pulmonary:      Effort: Pulmonary effort is normal.   Musculoskeletal:         General: Normal range of motion.      Cervical back: Normal range of motion.   Skin:     General: Skin is warm and dry.      Capillary

## 2024-04-11 ENCOUNTER — PATIENT MESSAGE (OUTPATIENT)
Dept: INTERNAL MEDICINE CLINIC | Age: 44
End: 2024-04-11

## 2024-04-11 DIAGNOSIS — R05.1 ACUTE COUGH: Primary | ICD-10-CM

## 2024-04-12 RX ORDER — BENZONATATE 100 MG/1
100 CAPSULE ORAL 3 TIMES DAILY PRN
Qty: 30 CAPSULE | Refills: 0 | Status: SHIPPED | OUTPATIENT
Start: 2024-04-12 | End: 2024-04-22

## 2024-04-16 ASSESSMENT — PATIENT HEALTH QUESTIONNAIRE - PHQ9
6. FEELING BAD ABOUT YOURSELF - OR THAT YOU ARE A FAILURE OR HAVE LET YOURSELF OR YOUR FAMILY DOWN: SEVERAL DAYS
1. LITTLE INTEREST OR PLEASURE IN DOING THINGS: NOT AT ALL
3. TROUBLE FALLING OR STAYING ASLEEP: NEARLY EVERY DAY
7. TROUBLE CONCENTRATING ON THINGS, SUCH AS READING THE NEWSPAPER OR WATCHING TELEVISION: SEVERAL DAYS
8. MOVING OR SPEAKING SO SLOWLY THAT OTHER PEOPLE COULD HAVE NOTICED. OR THE OPPOSITE, BEING SO FIGETY OR RESTLESS THAT YOU HAVE BEEN MOVING AROUND A LOT MORE THAN USUAL: MORE THAN HALF THE DAYS
8. MOVING OR SPEAKING SO SLOWLY THAT OTHER PEOPLE COULD HAVE NOTICED. OR THE OPPOSITE - BEING SO FIDGETY OR RESTLESS THAT YOU HAVE BEEN MOVING AROUND A LOT MORE THAN USUAL: MORE THAN HALF THE DAYS
6. FEELING BAD ABOUT YOURSELF - OR THAT YOU ARE A FAILURE OR HAVE LET YOURSELF OR YOUR FAMILY DOWN: SEVERAL DAYS
10. IF YOU CHECKED OFF ANY PROBLEMS, HOW DIFFICULT HAVE THESE PROBLEMS MADE IT FOR YOU TO DO YOUR WORK, TAKE CARE OF THINGS AT HOME, OR GET ALONG WITH OTHER PEOPLE: SOMEWHAT DIFFICULT
4. FEELING TIRED OR HAVING LITTLE ENERGY: NEARLY EVERY DAY
SUM OF ALL RESPONSES TO PHQ QUESTIONS 1-9: 10
10. IF YOU CHECKED OFF ANY PROBLEMS, HOW DIFFICULT HAVE THESE PROBLEMS MADE IT FOR YOU TO DO YOUR WORK, TAKE CARE OF THINGS AT HOME, OR GET ALONG WITH OTHER PEOPLE: SOMEWHAT DIFFICULT
9. THOUGHTS THAT YOU WOULD BE BETTER OFF DEAD, OR OF HURTING YOURSELF: NOT AT ALL
1. LITTLE INTEREST OR PLEASURE IN DOING THINGS: NOT AT ALL
2. FEELING DOWN, DEPRESSED OR HOPELESS: NOT AT ALL
SUM OF ALL RESPONSES TO PHQ9 QUESTIONS 1 & 2: 0
5. POOR APPETITE OR OVEREATING: NOT AT ALL
3. TROUBLE FALLING OR STAYING ASLEEP: NEARLY EVERY DAY
9. THOUGHTS THAT YOU WOULD BE BETTER OFF DEAD, OR OF HURTING YOURSELF: NOT AT ALL
5. POOR APPETITE OR OVEREATING: NOT AT ALL
2. FEELING DOWN, DEPRESSED OR HOPELESS: NOT AT ALL
4. FEELING TIRED OR HAVING LITTLE ENERGY: NEARLY EVERY DAY
7. TROUBLE CONCENTRATING ON THINGS, SUCH AS READING THE NEWSPAPER OR WATCHING TELEVISION: SEVERAL DAYS
SUM OF ALL RESPONSES TO PHQ QUESTIONS 1-9: 10

## 2024-04-18 ENCOUNTER — OFFICE VISIT (OUTPATIENT)
Dept: BARIATRICS/WEIGHT MGMT | Age: 44
End: 2024-04-18
Payer: COMMERCIAL

## 2024-04-18 VITALS
DIASTOLIC BLOOD PRESSURE: 84 MMHG | HEIGHT: 66 IN | OXYGEN SATURATION: 96 % | BODY MASS INDEX: 30.71 KG/M2 | HEART RATE: 91 BPM | WEIGHT: 191.1 LBS | SYSTOLIC BLOOD PRESSURE: 112 MMHG

## 2024-04-18 DIAGNOSIS — E66.9 OBESITY (BMI 30.0-34.9): Primary | ICD-10-CM

## 2024-04-18 DIAGNOSIS — Z79.899 MEDICATION MANAGEMENT: ICD-10-CM

## 2024-04-18 PROCEDURE — G8417 CALC BMI ABV UP PARAM F/U: HCPCS | Performed by: NURSE PRACTITIONER

## 2024-04-18 PROCEDURE — 99214 OFFICE O/P EST MOD 30 MIN: CPT | Performed by: NURSE PRACTITIONER

## 2024-04-18 PROCEDURE — 3079F DIAST BP 80-89 MM HG: CPT | Performed by: NURSE PRACTITIONER

## 2024-04-18 PROCEDURE — 1036F TOBACCO NON-USER: CPT | Performed by: NURSE PRACTITIONER

## 2024-04-18 PROCEDURE — 3074F SYST BP LT 130 MM HG: CPT | Performed by: NURSE PRACTITIONER

## 2024-04-18 PROCEDURE — G8427 DOCREV CUR MEDS BY ELIG CLIN: HCPCS | Performed by: NURSE PRACTITIONER

## 2024-04-18 RX ORDER — MECLIZINE HYDROCHLORIDE 25 MG/1
25 TABLET ORAL 3 TIMES DAILY PRN
COMMUNITY
Start: 2024-04-16

## 2024-04-18 RX ORDER — PHENTERMINE HYDROCHLORIDE 37.5 MG/1
37.5 TABLET ORAL
Qty: 30 TABLET | Refills: 0 | Status: SHIPPED | OUTPATIENT
Start: 2024-04-18 | End: 2024-05-18

## 2024-04-18 RX ORDER — METHYLPREDNISOLONE 4 MG/1
TABLET ORAL SEE ADMIN INSTRUCTIONS
COMMUNITY
Start: 2024-04-16

## 2024-04-18 RX ORDER — AMOXICILLIN AND CLAVULANATE POTASSIUM 875; 125 MG/1; MG/1
1 TABLET, FILM COATED ORAL 2 TIMES DAILY
COMMUNITY
Start: 2024-04-16

## 2024-04-18 NOTE — PROGRESS NOTES
Orders Placed This Encounter   Medications    phentermine (ADIPEX-P) 37.5 MG tablet     Sig: Take 1 tablet by mouth every morning (before breakfast) for 30 days. BMI 44. Max Daily Amount: 37.5 mg     Dispense:  30 tablet     Refill:  0        Follow Up:  Return in about 1 month (around 5/18/2024).      Barbie Baker, APRN - CNP

## 2024-04-19 ENCOUNTER — OFFICE VISIT (OUTPATIENT)
Dept: INTERNAL MEDICINE CLINIC | Age: 44
End: 2024-04-19
Payer: COMMERCIAL

## 2024-04-19 VITALS
BODY MASS INDEX: 31.14 KG/M2 | DIASTOLIC BLOOD PRESSURE: 76 MMHG | WEIGHT: 190 LBS | SYSTOLIC BLOOD PRESSURE: 128 MMHG | HEART RATE: 96 BPM | OXYGEN SATURATION: 96 %

## 2024-04-19 DIAGNOSIS — E78.00 PURE HYPERCHOLESTEROLEMIA: ICD-10-CM

## 2024-04-19 DIAGNOSIS — G47.33 OSA (OBSTRUCTIVE SLEEP APNEA): ICD-10-CM

## 2024-04-19 DIAGNOSIS — F41.9 ANXIETY: ICD-10-CM

## 2024-04-19 DIAGNOSIS — G47.10 HYPERSOMNOLENCE: ICD-10-CM

## 2024-04-19 DIAGNOSIS — E04.1 THYROID NODULE: ICD-10-CM

## 2024-04-19 DIAGNOSIS — E55.9 VITAMIN D DEFICIENCY: ICD-10-CM

## 2024-04-19 DIAGNOSIS — M21.612 BUNION OF LEFT FOOT: ICD-10-CM

## 2024-04-19 DIAGNOSIS — Z12.11 COLON CANCER SCREENING: ICD-10-CM

## 2024-04-19 DIAGNOSIS — R73.03 PREDIABETES: ICD-10-CM

## 2024-04-19 DIAGNOSIS — R42 VERTIGO: Primary | ICD-10-CM

## 2024-04-19 DIAGNOSIS — I10 PRIMARY HYPERTENSION: ICD-10-CM

## 2024-04-19 PROCEDURE — G8417 CALC BMI ABV UP PARAM F/U: HCPCS

## 2024-04-19 PROCEDURE — 1036F TOBACCO NON-USER: CPT

## 2024-04-19 PROCEDURE — 99214 OFFICE O/P EST MOD 30 MIN: CPT

## 2024-04-19 PROCEDURE — G8427 DOCREV CUR MEDS BY ELIG CLIN: HCPCS

## 2024-04-19 PROCEDURE — 3078F DIAST BP <80 MM HG: CPT

## 2024-04-19 PROCEDURE — 3074F SYST BP LT 130 MM HG: CPT

## 2024-04-19 RX ORDER — HYDROXYZINE 50 MG/1
50 TABLET, FILM COATED ORAL NIGHTLY
Qty: 30 TABLET | Refills: 0 | Status: SHIPPED | OUTPATIENT
Start: 2024-04-19 | End: 2024-05-19

## 2024-04-19 ASSESSMENT — ENCOUNTER SYMPTOMS
VOMITING: 0
WHEEZING: 0
EYE DISCHARGE: 0
EYE PAIN: 0
STRIDOR: 0
CHOKING: 0
DIARRHEA: 0
SORE THROAT: 0
CONSTIPATION: 0
TROUBLE SWALLOWING: 0
EYE REDNESS: 0
COUGH: 0
COLOR CHANGE: 0
ABDOMINAL PAIN: 0
FACIAL SWELLING: 0
NAUSEA: 0
CHEST TIGHTNESS: 0
RHINORRHEA: 0
SHORTNESS OF BREATH: 0

## 2024-04-19 ASSESSMENT — VISUAL ACUITY: OU: 1

## 2024-04-19 NOTE — PROGRESS NOTES
Subjective:      Chief Complaint   Patient presents with    6 Month Follow-Up    Referral - General     Gastroenterology, Podiatry      HPI:  Samina Esquivel is a 43 y.o. female who presents today for 6 month f/u of chronic conditions.     Patient reports she is wearing CPAP machine nightly, still having trouble sleeping. Stopped taking Wellbutrin when starting weight loss medication. Not feeling depressed but feeling anxious. Would like to discuss something for anxiety/sleep.     Turning 44 next week and wants to schedule colonoscopy.     On adipex for a few months at weight management, going well lost a little bit of weight    Bunion on left foot, needs podiatry referral    Had a cold last week with sinus pressure, cough, and vertigo. Had to bee seen at urgent care.    Past Medical History:   Diagnosis Date    Abdominal gas pain     Asthma     Back pain     Constipation     Depression N/a    Fatigue     Headache     Hypertension 10/13/2023    Kidney stone     Left flank pain     Leg pain     Pelvic pain     Pure hypercholesterolemia 11/29/2023      Past Surgical History:   Procedure Laterality Date    CARPAL TUNNEL RELEASE Right 10/09/2023    RIGHT CARPAL TUNNEL RELEASE performed by Javi Arroyo MD at [facility] OR    CARPAL TUNNEL RELEASE Left 10/23/2023    LEFT CARPAL TUNNEL RELEASE performed by Javi Arroyo MD at [facility] OR    CYSTOSCOPY N/A 02/16/2021    CYSTOSCOPY performed by Deny Louis MD at [facility] OR    DILATION AND CURETTAGE      ENDOMETRIAL ABLATION      HERNIA REPAIR      HYSTERECTOMY, VAGINAL N/A 02/16/2021    [facility] with bilateral salpingectomy     HYSTEROSCOPY      IR BIOPSY THYROID PERC CORE NEEDLE  07/10/2023    IR BIOPSY THYROID PERC CORE NEEDLE 7/10/2023 [facility] SPECIAL PROCEDURES    LAPAROSCOPY N/A 09/22/2021    LAPAROSCOPY EXPLORATORY, LYSIS OF ADHESIONS, RIGHT OOPHORECTOMY performed by Hayder Ball MD at [facility] OR    OVARY REMOVAL Right 09/22/2021    TUBAL LIGATION       Social

## 2024-04-23 ENCOUNTER — TELEPHONE (OUTPATIENT)
Dept: GASTROENTEROLOGY | Age: 44
End: 2024-04-23

## 2024-04-23 NOTE — TELEPHONE ENCOUNTER
Called pt in regards to a referral for colon cancer screening. Scheduled pt for 5/21/24 @4:15pm Jeffrey

## 2024-05-27 DIAGNOSIS — I10 HYPERTENSION, UNSPECIFIED TYPE: ICD-10-CM

## 2024-05-27 DIAGNOSIS — F41.9 ANXIETY: ICD-10-CM

## 2024-05-27 DIAGNOSIS — E55.9 VITAMIN D INSUFFICIENCY: ICD-10-CM

## 2024-05-27 DIAGNOSIS — G47.10 HYPERSOMNOLENCE: ICD-10-CM

## 2024-05-28 RX ORDER — MELATONIN
1 DAILY
Qty: 90 TABLET | Refills: 0 | Status: SHIPPED | OUTPATIENT
Start: 2024-05-28

## 2024-05-28 RX ORDER — LOSARTAN POTASSIUM AND HYDROCHLOROTHIAZIDE 25; 100 MG/1; MG/1
1 TABLET ORAL DAILY
Qty: 90 TABLET | Refills: 0 | Status: SHIPPED | OUTPATIENT
Start: 2024-05-28

## 2024-05-28 RX ORDER — ALBUTEROL SULFATE 90 UG/1
2 AEROSOL, METERED RESPIRATORY (INHALATION) EVERY 4 HOURS PRN
Qty: 18 G | Refills: 0 | Status: SHIPPED | OUTPATIENT
Start: 2024-05-28

## 2024-05-28 RX ORDER — HYDROXYZINE 50 MG/1
50 TABLET, FILM COATED ORAL NIGHTLY
Qty: 30 TABLET | Refills: 0 | Status: SHIPPED | OUTPATIENT
Start: 2024-05-28 | End: 2024-06-27

## 2024-06-22 DIAGNOSIS — G47.10 HYPERSOMNOLENCE: ICD-10-CM

## 2024-06-22 DIAGNOSIS — F41.9 ANXIETY: ICD-10-CM

## 2024-06-24 RX ORDER — HYDROXYZINE 50 MG/1
50 TABLET, FILM COATED ORAL NIGHTLY
Qty: 90 TABLET | Refills: 1 | Status: SHIPPED | OUTPATIENT
Start: 2024-06-24 | End: 2024-12-21

## 2024-06-24 NOTE — TELEPHONE ENCOUNTER
Patient does not have appt scheduled @ this time  
Impression: Patient has 9 days of fever and general constitutional symptoms, but lacks symptoms that let us localize the source of the problem. Potential sources of fever (infectious, neoplastic, autoimmune, endocrine, and pharmacologic) must be ruled out.     Plan:  -Obtain CBC, CMP, CRP, TFT's, HIV test, blood cultures, CXR, RVP, UA/UC, urine pregnancy test

## 2024-07-01 ENCOUNTER — PREP FOR PROCEDURE (OUTPATIENT)
Dept: GASTROENTEROLOGY | Age: 44
End: 2024-07-01

## 2024-07-01 ENCOUNTER — OFFICE VISIT (OUTPATIENT)
Dept: GASTROENTEROLOGY | Age: 44
End: 2024-07-01
Payer: COMMERCIAL

## 2024-07-01 VITALS
DIASTOLIC BLOOD PRESSURE: 70 MMHG | SYSTOLIC BLOOD PRESSURE: 120 MMHG | WEIGHT: 206.2 LBS | HEIGHT: 66 IN | OXYGEN SATURATION: 99 % | BODY MASS INDEX: 33.14 KG/M2 | HEART RATE: 83 BPM

## 2024-07-01 DIAGNOSIS — Z12.11 SCREEN FOR COLON CANCER: ICD-10-CM

## 2024-07-01 DIAGNOSIS — Z12.11 ENCOUNTER FOR SCREENING COLONOSCOPY: Primary | ICD-10-CM

## 2024-07-01 PROCEDURE — S0285 CNSLT BEFORE SCREEN COLONOSC: HCPCS | Performed by: NURSE PRACTITIONER

## 2024-07-01 RX ORDER — SODIUM CHLORIDE 0.9 % (FLUSH) 0.9 %
5-40 SYRINGE (ML) INJECTION PRN
OUTPATIENT
Start: 2024-07-01

## 2024-07-01 RX ORDER — SODIUM CHLORIDE 0.9 % (FLUSH) 0.9 %
5-40 SYRINGE (ML) INJECTION EVERY 12 HOURS SCHEDULED
OUTPATIENT
Start: 2024-07-01

## 2024-07-01 RX ORDER — POLYETHYLENE GLYCOL 3350, SODIUM SULFATE, SODIUM CHLORIDE, POTASSIUM CHLORIDE, ASCORBIC ACID, SODIUM ASCORBATE 140-9-5.2G
1 KIT ORAL ONCE
Qty: 1 EACH | Refills: 0 | Status: SHIPPED | OUTPATIENT
Start: 2024-07-01 | End: 2024-07-01

## 2024-07-01 RX ORDER — SODIUM CHLORIDE, SODIUM LACTATE, POTASSIUM CHLORIDE, CALCIUM CHLORIDE 600; 310; 30; 20 MG/100ML; MG/100ML; MG/100ML; MG/100ML
INJECTION, SOLUTION INTRAVENOUS CONTINUOUS
OUTPATIENT
Start: 2024-07-01

## 2024-07-01 RX ORDER — SODIUM CHLORIDE 9 MG/ML
INJECTION, SOLUTION INTRAVENOUS PRN
OUTPATIENT
Start: 2024-07-01

## 2024-07-01 RX ORDER — SIMETHICONE 80 MG
80 TABLET,CHEWABLE ORAL ONCE
Qty: 3 TABLET | Refills: 0 | Status: SHIPPED | OUTPATIENT
Start: 2024-07-01 | End: 2024-07-01

## 2024-07-01 ASSESSMENT — ENCOUNTER SYMPTOMS
PHOTOPHOBIA: 0
COLOR CHANGE: 0
DIARRHEA: 0
COUGH: 0
NAUSEA: 0
EYE PAIN: 0
ABDOMINAL PAIN: 0
CONSTIPATION: 0
SHORTNESS OF BREATH: 0
BLOOD IN STOOL: 0
VOMITING: 0
BACK PAIN: 0

## 2024-07-01 NOTE — PROGRESS NOTES
Samina Esquivel 44 y.o. female was seen by IRMA Wray on 07/01/24     Wt Readings from Last 3 Encounters:   07/01/24 93.5 kg (206 lb 3.2 oz)   04/19/24 86.2 kg (190 lb)   04/18/24 86.7 kg (191 lb 1.6 oz)       HPI  Samina Esquivel is a pleasant 44 y.o.  female who presents today for colonoscopy screening.  She has a past medical history of abdominal gas pain, asthma, back pain, constipation, depression, fatigue, headache, hypertension, kidney stone, left flank pain, leg pain, obesity, pelvic pain, and pure hypercholesterolemia.  She has never had a colonoscopy.  She denies changes in her bowel pattern.  Her typical bowel pattern is every other day with soft brown formed stools.  No diarrhea or constipation.  No blood in her stools or melena.  No excess belching or flatulence.  Her appetite is good without early satiety.  Her weight is stable.  No nausea or vomiting.  No abdominal pain, bloating or distention.  No heartburn or acid reflux.  No nocturnal awakenings with acid reflux.  No dypshagia or pain with swallowing.  No family history of stomach or colon cancer.    ROS  Review of Systems   Constitutional:  Negative for appetite change, chills, diaphoresis, fatigue and fever.   HENT:  Negative for ear pain, hearing loss and tinnitus.    Eyes:  Positive for visual disturbance. Negative for photophobia and pain.   Respiratory:  Negative for cough and shortness of breath.    Cardiovascular:  Negative for chest pain, palpitations and leg swelling.   Gastrointestinal:  Negative for abdominal pain, blood in stool, constipation, diarrhea, nausea and vomiting.   Endocrine: Negative for cold intolerance, heat intolerance and polydipsia.   Genitourinary:  Negative for dysuria, frequency and urgency.   Musculoskeletal:  Negative for back pain, myalgias and neck pain.   Skin:  Negative for color change, pallor and rash.   Allergic/Immunologic: Negative for environmental allergies and food allergies.

## 2024-07-16 ENCOUNTER — HOSPITAL ENCOUNTER (OUTPATIENT)
Dept: WOMENS IMAGING | Age: 44
Discharge: HOME | End: 2024-07-16
Payer: COMMERCIAL

## 2024-07-16 VITALS — BODY MASS INDEX: 30.53 KG/M2 | HEIGHT: 66 IN | WEIGHT: 190 LBS

## 2024-07-16 DIAGNOSIS — Z12.31 ENCOUNTER FOR SCREENING MAMMOGRAM FOR BREAST CANCER: ICD-10-CM

## 2024-07-16 PROCEDURE — 77063 BREAST TOMOSYNTHESIS BI: CPT

## 2024-07-31 PROBLEM — Z12.11 SCREEN FOR COLON CANCER: Status: RESOLVED | Noted: 2024-07-01 | Resolved: 2024-07-31

## 2024-08-03 DIAGNOSIS — G47.10 HYPERSOMNOLENCE: ICD-10-CM

## 2024-08-03 DIAGNOSIS — F41.9 ANXIETY: ICD-10-CM

## 2024-08-05 RX ORDER — HYDROXYZINE 50 MG/1
50 TABLET, FILM COATED ORAL NIGHTLY
Qty: 90 TABLET | Refills: 1 | Status: SHIPPED | OUTPATIENT
Start: 2024-08-05 | End: 2025-02-01

## 2024-08-08 DIAGNOSIS — G47.10 HYPERSOMNOLENCE: ICD-10-CM

## 2024-08-08 DIAGNOSIS — F41.9 ANXIETY: ICD-10-CM

## 2024-08-08 RX ORDER — HYDROXYZINE HYDROCHLORIDE 50 MG/1
50 TABLET, FILM COATED ORAL NIGHTLY
Qty: 30 TABLET | Refills: 0 | OUTPATIENT
Start: 2024-08-08 | End: 2025-02-04

## 2024-08-20 ENCOUNTER — TELEPHONE (OUTPATIENT)
Dept: GASTROENTEROLOGY | Age: 44
End: 2024-08-20

## 2024-08-21 DIAGNOSIS — E55.9 VITAMIN D INSUFFICIENCY: ICD-10-CM

## 2024-08-21 DIAGNOSIS — I10 HYPERTENSION, UNSPECIFIED TYPE: ICD-10-CM

## 2024-08-21 RX ORDER — CHOLECALCIFEROL (VITAMIN D3) 25 MCG
1 TABLET ORAL DAILY
Qty: 90 TABLET | Refills: 0 | Status: SHIPPED | OUTPATIENT
Start: 2024-08-21

## 2024-08-21 RX ORDER — LOSARTAN POTASSIUM AND HYDROCHLOROTHIAZIDE 25; 100 MG/1; MG/1
1 TABLET ORAL DAILY
Qty: 90 TABLET | Refills: 0 | Status: SHIPPED | OUTPATIENT
Start: 2024-08-21

## 2024-08-28 NOTE — PROGRESS NOTES
Surgery @ [facility] on 09/06/24 you will be called 09/05/24 with times    NOTHING TO EAT OR DRINK AFTER MIDNIGHT DAY OF SURGERY    1. Enter thru the hospital main entrance on day of surgery, check in at the Information Desk. If you arrive prior to 6:00am, enter thru the ER entrance.    2. Follow the directions as prescribed by the doctor for your procedure and medications.                  Stop vitamins, supplements and NSAIDS:  08/29/24    3. Check with your Doctor regarding stopping blood thinners and follow their instructions.    4. Do not smoke, vape or use chewing tobacco morning of surgery. Do not drink any alcoholic beverages 24 hours prior to surgery.       This includes NA Beer. No street drugs 7 days prior to surgery.    5. If you have dentures, contacts of glasses they will be removed before going to the OR; please bring a case.    6. Please bring picture ID, insurance card, paperwork from the doctor’s office (H & P, Consent, & card for implantable devices).    7. Take a shower with an antibacterial soap the night before surgery and the morning of surgery. Do not put anything on your skin      After your morning shower.    8. You will need a responsible adult to drive you home and check on you after surgery.

## 2024-09-04 PROBLEM — Z12.11 SCREEN FOR COLON CANCER: Status: ACTIVE | Noted: 2024-07-01

## 2024-09-05 ENCOUNTER — ANESTHESIA EVENT (OUTPATIENT)
Dept: ENDOSCOPY | Age: 44
End: 2024-09-05
Payer: COMMERCIAL

## 2024-09-05 NOTE — PROGRESS NOTES
Spoke with patient and she will arrive at 0945 at [facility] on 9/6/2024 for her procedure at 1115. Also went over prep time. Orders are in epic.

## 2024-09-05 NOTE — ANESTHESIA PRE PROCEDURE
Department of Anesthesiology  Preprocedure Note       Name:  Samina Esquivel   Age:  44 y.o.  :  1980                                          MRN:  [id number]         Date:  2024      Surgeon: Surgeon(s):  Pietro Patel MD    Procedure: Procedure(s):  COLONOSCOPY DIAGNOSTIC    Medications prior to admission:   Prior to Admission medications    Medication Sig Start Date End Date Taking? Authorizing Provider   vitamin D3 (CHOLECALCIFEROL) 25 MCG (1000 UT) TABS tablet TAKE 1 TABLET BY MOUTH EVERY DAY 24   Tanya Mock APRN - CNP   losartan-hydroCHLOROthiazide (HYZAAR) 100-25 MG per tablet TAKE 1 TABLET BY MOUTH EVERY DAY 24   Tanya Mock APRN - CNP   hydrOXYzine HCl (ATARAX) 50 MG tablet Take 1 tablet by mouth nightly 24  Tanya Mock APRN - CNP   simethicone (MYLICON) 80 MG chewable tablet Take 1 tablet by mouth once for 1 dose 24  Vy Escobar APRN - CNP   albuterol sulfate HFA (PROVENTIL;VENTOLIN;PROAIR) 108 (90 Base) MCG/ACT inhaler Inhale 2 puffs into the lungs every 4 hours as needed for Wheezing 24   Tanya Mock APRN - CNP       Current medications:    No current facility-administered medications for this encounter.     Current Outpatient Medications   Medication Sig Dispense Refill    vitamin D3 (CHOLECALCIFEROL) 25 MCG (1000 UT) TABS tablet TAKE 1 TABLET BY MOUTH EVERY DAY 90 tablet 0    losartan-hydroCHLOROthiazide (HYZAAR) 100-25 MG per tablet TAKE 1 TABLET BY MOUTH EVERY DAY 90 tablet 0    hydrOXYzine HCl (ATARAX) 50 MG tablet Take 1 tablet by mouth nightly 90 tablet 1    simethicone (MYLICON) 80 MG chewable tablet Take 1 tablet by mouth once for 1 dose 3 tablet 0    albuterol sulfate HFA (PROVENTIL;VENTOLIN;PROAIR) 108 (90 Base) MCG/ACT inhaler Inhale 2 puffs into the lungs every 4 hours as needed for Wheezing 18 g 0       Allergies:  No Known Allergies    Problem List:    Patient Active Problem List   Diagnosis Code    Thyroid

## 2024-09-06 ENCOUNTER — ANESTHESIA (OUTPATIENT)
Dept: ENDOSCOPY | Age: 44
End: 2024-09-06
Payer: COMMERCIAL

## 2024-09-06 ENCOUNTER — HOSPITAL ENCOUNTER (OUTPATIENT)
Age: 44
Setting detail: OUTPATIENT SURGERY
Discharge: HOME | End: 2024-09-06
Attending: INTERNAL MEDICINE | Admitting: INTERNAL MEDICINE
Payer: COMMERCIAL

## 2024-09-06 VITALS
TEMPERATURE: 97.3 F | SYSTOLIC BLOOD PRESSURE: 136 MMHG | DIASTOLIC BLOOD PRESSURE: 90 MMHG | BODY MASS INDEX: 32.14 KG/M2 | HEIGHT: 66 IN | WEIGHT: 200 LBS | RESPIRATION RATE: 16 BRPM | HEART RATE: 61 BPM | OXYGEN SATURATION: 98 %

## 2024-09-06 PROCEDURE — 3700000000 HC ANESTHESIA ATTENDED CARE: Performed by: INTERNAL MEDICINE

## 2024-09-06 PROCEDURE — 6360000002 HC RX W HCPCS

## 2024-09-06 PROCEDURE — 2580000003 HC RX 258: Performed by: LICENSED PRACTICAL NURSE

## 2024-09-06 PROCEDURE — 3700000001 HC ADD 15 MINUTES (ANESTHESIA): Performed by: INTERNAL MEDICINE

## 2024-09-06 PROCEDURE — 7100000010 HC PHASE II RECOVERY - FIRST 15 MIN: Performed by: INTERNAL MEDICINE

## 2024-09-06 PROCEDURE — 7100000011 HC PHASE II RECOVERY - ADDTL 15 MIN: Performed by: INTERNAL MEDICINE

## 2024-09-06 PROCEDURE — 2709999900 HC NON-CHARGEABLE SUPPLY: Performed by: INTERNAL MEDICINE

## 2024-09-06 PROCEDURE — 3609027000 HC COLONOSCOPY: Performed by: INTERNAL MEDICINE

## 2024-09-06 PROCEDURE — 2500000003 HC RX 250 WO HCPCS

## 2024-09-06 PROCEDURE — 45378 DIAGNOSTIC COLONOSCOPY: CPT | Performed by: INTERNAL MEDICINE

## 2024-09-06 RX ORDER — LIDOCAINE HYDROCHLORIDE 20 MG/ML
INJECTION, SOLUTION EPIDURAL; INFILTRATION; INTRACAUDAL; PERINEURAL PRN
Status: DISCONTINUED | OUTPATIENT
Start: 2024-09-06 | End: 2024-09-06 | Stop reason: SDUPTHER

## 2024-09-06 RX ORDER — PROPOFOL 10 MG/ML
INJECTION, EMULSION INTRAVENOUS PRN
Status: DISCONTINUED | OUTPATIENT
Start: 2024-09-06 | End: 2024-09-06 | Stop reason: SDUPTHER

## 2024-09-06 RX ORDER — SODIUM CHLORIDE 0.9 % (FLUSH) 0.9 %
5-40 SYRINGE (ML) INJECTION PRN
Status: DISCONTINUED | OUTPATIENT
Start: 2024-09-06 | End: 2024-09-06 | Stop reason: HOSPADM

## 2024-09-06 RX ORDER — SODIUM CHLORIDE 9 MG/ML
INJECTION, SOLUTION INTRAVENOUS PRN
Status: DISCONTINUED | OUTPATIENT
Start: 2024-09-06 | End: 2024-09-06 | Stop reason: HOSPADM

## 2024-09-06 RX ORDER — SODIUM CHLORIDE, SODIUM LACTATE, POTASSIUM CHLORIDE, CALCIUM CHLORIDE 600; 310; 30; 20 MG/100ML; MG/100ML; MG/100ML; MG/100ML
INJECTION, SOLUTION INTRAVENOUS CONTINUOUS
Status: DISCONTINUED | OUTPATIENT
Start: 2024-09-06 | End: 2024-09-06 | Stop reason: HOSPADM

## 2024-09-06 RX ORDER — SODIUM CHLORIDE 0.9 % (FLUSH) 0.9 %
5-40 SYRINGE (ML) INJECTION EVERY 12 HOURS SCHEDULED
Status: DISCONTINUED | OUTPATIENT
Start: 2024-09-06 | End: 2024-09-06 | Stop reason: HOSPADM

## 2024-09-06 RX ADMIN — PROPOFOL 250 MG: 10 INJECTION, EMULSION INTRAVENOUS at 11:54

## 2024-09-06 RX ADMIN — SODIUM CHLORIDE, POTASSIUM CHLORIDE, SODIUM LACTATE AND CALCIUM CHLORIDE: 600; 310; 30; 20 INJECTION, SOLUTION INTRAVENOUS at 10:02

## 2024-09-06 RX ADMIN — LIDOCAINE HYDROCHLORIDE 100 MG: 20 INJECTION, SOLUTION EPIDURAL; INFILTRATION; INTRACAUDAL; PERINEURAL at 11:54

## 2024-09-06 ASSESSMENT — PAIN SCALES - GENERAL: PAINLEVEL_OUTOF10: 0

## 2024-09-06 ASSESSMENT — PAIN - FUNCTIONAL ASSESSMENT
PAIN_FUNCTIONAL_ASSESSMENT: 0-10
PAIN_FUNCTIONAL_ASSESSMENT: 0-10

## 2024-09-06 NOTE — PROGRESS NOTES
1300 discharge instructions given to pt, voiced understanding    1310 pt escorted to main entrance via w/c,  for discharge with mom

## 2024-09-06 NOTE — DISCHARGE INSTRUCTIONS
COLONOSCOPY    DR. Patel     OFFICE NUMBER [phone]    FOLLOW UP APPOINTMENT AS NEEDED.     REPEAT PROCEDURE IN 10 years     TEST ORDERED: NONE     What to expect at home:  Your Recovery   Your doctor will tell you when you can eat and do your other usual activities Your doctor will talk to you about when you will need your next colonoscopy. Your doctor can help you decide how often you need to be checked. This will depend on the results of your test and your risk for colorectal cancer.  After the test, you may be bloated or have gas pains. You may need to pass gas. If a biopsy was done or a polyp was removed, you may have streaks of blood in your stool (feces) for a few days.  This care sheet gives you a general idea about how long it will take for you to recover. But each person recovers at a different pace. Follow the steps below to get better as quickly as possible.  How can you care for yourself at home?  Activity  Rest when you feel tired.  Diet  Follow your doctor's directions for eating.  Unless your doctor has told you not to, drink plenty of fluids. This helps to replace the fluids that were lost during the colon prep.  DO NOT DRINK ALCOHOL.  Medicines  Your doctor will tell you if and when you can restart your medicines. He or she will also give you instructions about taking any new medicines.  If you take blood thinners, such as warfarin (Coumadin), clopidogrel (Plavix), or aspirin, be sure to talk to your doctor. He or she will tell you if and when to start taking those medicines again. Make sure that you understand exactly what your doctor wants you to do.  If polyps were removed or a biopsy was done during the test, your doctor may tell you not to take aspirin or other anti-inflammatory medicines for a few days. These include ibuprofen (Advil, Motrin) and naproxen (Aleve).  Other instructions: Anethesia  For your safety, do not drive or operate machinery for 24 hours.  Do not sign legal documents or  any alcoholic beverages.  Do not smoke while alone.  Avoid making important decisions.  Plan to spend a quiet, relaxed evening @ home.  Resume normal activities as you begin to feel better.  Eat lightly for your first meal, then gradually increase your diet to what is normal for you.  In case of nausea, avoid food and drink only clear liquids.  Resume food as nausea ceases.  Notify your surgeon if you experience fever, chills, large amount of bleeding, difficulty breathing, persistent nausea and vomiting or any other disturbing problem.  Call for a follow-up appointment with your surgeon.

## 2024-09-06 NOTE — PROGRESS NOTES
Returned to room 23. Report received from Zayra FERGUSON. Alert and oriented. Respirations even and unlabored.Color pink. Abdomen soft and nontender. Beverage provided. Call light in reach.

## 2024-09-06 NOTE — PROGRESS NOTES
1140: Pt alert and oriented x 4. Mother at bedside. Pre-op questions asked and answered appropriately by pt. Name, , armband verified, procedure, allergies, implants, OB status, Prep status, last PO intake, history, meds.

## 2024-09-06 NOTE — ANESTHESIA PRE PROCEDURE
Department of Anesthesiology  Preprocedure Note       Name:  Samina Esquivel   Age:  44 y.o.  :  1980                                          MRN:  [id number]         Date:  2024      Surgeon: Surgeon(s):  Pietro aPtel MD    Procedure: Procedure(s):  COLONOSCOPY DIAGNOSTIC    Medications prior to admission:   Prior to Admission medications    Medication Sig Start Date End Date Taking? Authorizing Provider   vitamin D3 (CHOLECALCIFEROL) 25 MCG (1000 UT) TABS tablet TAKE 1 TABLET BY MOUTH EVERY DAY 24  Yes Tanya Mock APRN - CNP   losartan-hydroCHLOROthiazide (HYZAAR) 100-25 MG per tablet TAKE 1 TABLET BY MOUTH EVERY DAY 24  Yes Tanya Mock APRN - CNP   hydrOXYzine HCl (ATARAX) 50 MG tablet Take 1 tablet by mouth nightly 24 Yes Tanya Mock APRN - CNP   albuterol sulfate HFA (PROVENTIL;VENTOLIN;PROAIR) 108 (90 Base) MCG/ACT inhaler Inhale 2 puffs into the lungs every 4 hours as needed for Wheezing 24  Yes Tanya Mock APRN - CNP   simethicone (MYLICON) 80 MG chewable tablet Take 1 tablet by mouth once for 1 dose 24  Vy Escobar APRN - CNP       Current medications:    Current Facility-Administered Medications   Medication Dose Route Frequency Provider Last Rate Last Admin   • sodium chloride flush 0.9 % injection 5-40 mL  5-40 mL IntraVENous 2 times per day Ariella Clarke APRN - CNP       • sodium chloride flush 0.9 % injection 5-40 mL  5-40 mL IntraVENous PRN Ariella Clarke APRN - CNP       • 0.9 % sodium chloride infusion   IntraVENous PRN Ariella Clarke APRN - CNP       • lactated ringers IV soln infusion   IntraVENous Continuous Ariella Clarke APRN - CNP 75 mL/hr at 24 1002 New Bag at 24 1002       Allergies:  No Known Allergies    Problem List:    Patient Active Problem List   Diagnosis Code   • Thyroid nodule E04.1   • Menorrhalgia N94.6   • Chronic female pelvic pain R10.2, G89.29   • Adhesion of intestine

## 2024-09-06 NOTE — ANESTHESIA POSTPROCEDURE EVALUATION
Department of Anesthesiology  Postprocedure Note    Patient: Samina Esquivel  MRN: [id number]  YOB: 1980  Date of evaluation: 9/6/2024    Procedure Summary       Date: 09/06/24 Room / Location: [address]    Anesthesia Start: 1150 Anesthesia Stop: 1214    Procedure: COLONOSCOPY DIAGNOSTIC Diagnosis:       Screen for colon cancer      (Screen for colon cancer [Z12.11])    Surgeons: Pietro Patel MD Responsible Provider: Manuel Iqbal MD    Anesthesia Type: MAC ASA Status: 2            Anesthesia Type: No value filed.    Prudence Phase I: Prudence Score: 10    Prudence Phase II:  10    Anesthesia Post Evaluation    Patient location during evaluation: bedside  Patient participation: complete - patient participated  Level of consciousness: awake and alert  Pain score: 0  Airway patency: patent  Nausea & Vomiting: no nausea and no vomiting  Cardiovascular status: hemodynamically stable  Respiratory status: acceptable, room air, spontaneous ventilation and nonlabored ventilation  Hydration status: euvolemic  Pain management: adequate        No notable events documented.

## 2024-09-06 NOTE — H&P
ENDOSCOPY   Pre-operative History and Physical    Patient: Samina Esquivel  : 1980      History Obtained From:  patient, electronic medical record        HISTORY OF PRESENT ILLNESS:                The patient is a 44 y.o. female with significant past medical history as below who presents for colonoscopy    Indication Colon cancer screening.     Past Medical History:        Diagnosis Date    Abdominal gas pain     Asthma     Back pain     Constipation     Depression N/a    Fatigue     Headache     Hypertension 10/13/2023    Kidney stone     Left flank pain     Leg pain     Pelvic pain     Pure hypercholesterolemia 2023       Past Surgical History:        Procedure Laterality Date    CARPAL TUNNEL RELEASE Right 10/09/2023    RIGHT CARPAL TUNNEL RELEASE performed by Javi Arroyo MD at [facility] OR    CARPAL TUNNEL RELEASE Left 10/23/2023    LEFT CARPAL TUNNEL RELEASE performed by Javi Arroyo MD at [facility] OR    CYSTOSCOPY N/A 2021    CYSTOSCOPY performed by Deny Louis MD at [facility] OR    DILATION AND CURETTAGE      ENDOMETRIAL ABLATION      HERNIA REPAIR      HYSTERECTOMY, VAGINAL N/A 2021    [facility] with bilateral salpingectomy     HYSTEROSCOPY      IR BIOPSY THYROID PERC CORE NEEDLE  07/10/2023    IR BIOPSY THYROID PERC CORE NEEDLE 7/10/2023 [facility] SPECIAL PROCEDURES    LAPAROSCOPY N/A 2021    LAPAROSCOPY EXPLORATORY, LYSIS OF ADHESIONS, RIGHT OOPHORECTOMY performed by Hayder Ball MD at [facility] OR    OVARY REMOVAL Right 2021    TUBAL LIGATION           Current Medications:    Medications    Prior to Admission medications    Medication Sig Start Date End Date Taking? Authorizing Provider   vitamin D3 (CHOLECALCIFEROL) 25 MCG (1000 UT) TABS tablet TAKE 1 TABLET BY MOUTH EVERY DAY 24  Yes Tanya Mock APRN - CNP   losartan-hydroCHLOROthiazide (HYZAAR) 100-25 MG per tablet TAKE 1 TABLET BY MOUTH EVERY DAY 24  Yes Tanya Mock APRN - CNP   hydrOXYzine HCl

## 2024-09-20 ENCOUNTER — HOSPITAL ENCOUNTER (OUTPATIENT)
Age: 44
Discharge: HOME | End: 2024-09-20
Payer: COMMERCIAL

## 2024-09-20 DIAGNOSIS — E78.00 PURE HYPERCHOLESTEROLEMIA: ICD-10-CM

## 2024-09-20 DIAGNOSIS — E55.9 VITAMIN D DEFICIENCY: ICD-10-CM

## 2024-09-20 DIAGNOSIS — R73.03 PREDIABETES: ICD-10-CM

## 2024-09-20 LAB
25(OH)D3 SERPL-MCNC: 28.4 NG/ML (ref 30–150)
ALBUMIN SERPL-MCNC: 4.4 G/DL (ref 3.4–5)
ALBUMIN/GLOB SERPL: 1.6 {RATIO} (ref 1.1–2.2)
ALP SERPL-CCNC: 129 U/L (ref 40–129)
ALT SERPL-CCNC: 38 U/L (ref 10–40)
ANION GAP SERPL CALCULATED.3IONS-SCNC: 13 MMOL/L (ref 9–17)
AST SERPL-CCNC: 28 U/L (ref 15–37)
BASOPHILS # BLD: 0.06 K/UL
BASOPHILS NFR BLD: 1 % (ref 0–1)
BILIRUB SERPL-MCNC: 0.5 MG/DL (ref 0–1)
BUN SERPL-MCNC: 11 MG/DL (ref 7–20)
CALCIUM SERPL-MCNC: 9.8 MG/DL (ref 8.3–10.6)
CHLORIDE SERPL-SCNC: 100 MMOL/L (ref 99–110)
CHOLEST SERPL-MCNC: 213 MG/DL (ref 125–199)
CO2 SERPL-SCNC: 26 MMOL/L (ref 21–32)
CREAT SERPL-MCNC: 0.8 MG/DL (ref 0.6–1.1)
EOSINOPHIL # BLD: 0.14 K/UL
EOSINOPHILS RELATIVE PERCENT: 2 % (ref 0–3)
ERYTHROCYTE [DISTWIDTH] IN BLOOD BY AUTOMATED COUNT: 13.2 % (ref 11.7–14.9)
EST. AVERAGE GLUCOSE BLD GHB EST-MCNC: 131 MG/DL
GFR, ESTIMATED: 77 ML/MIN/1.73M2
GLUCOSE SERPL-MCNC: 95 MG/DL (ref 74–99)
HBA1C MFR BLD: 6.2 % (ref 4.2–6.3)
HCT VFR BLD AUTO: 36.4 % (ref 37–47)
HDLC SERPL-MCNC: 56 MG/DL
HGB BLD-MCNC: 11.6 G/DL (ref 12.5–16)
IMM GRANULOCYTES # BLD AUTO: 0.01 K/UL
IMM GRANULOCYTES NFR BLD: 0 %
LDLC SERPL CALC-MCNC: 137 MG/DL
LYMPHOCYTES NFR BLD: 2.81 K/UL
LYMPHOCYTES RELATIVE PERCENT: 40 % (ref 24–44)
MCH RBC QN AUTO: 26.9 PG (ref 27–31)
MCHC RBC AUTO-ENTMCNC: 31.9 G/DL (ref 32–36)
MCV RBC AUTO: 84.5 FL (ref 78–100)
MONOCYTES NFR BLD: 0.48 K/UL
MONOCYTES NFR BLD: 7 % (ref 0–4)
NEUTROPHILS NFR BLD: 50 % (ref 36–66)
NEUTS SEG NFR BLD: 3.49 K/UL
PLATELET # BLD AUTO: 266 K/UL (ref 140–440)
PMV BLD AUTO: 11 FL (ref 7.5–11.1)
POTASSIUM SERPL-SCNC: 3.8 MMOL/L (ref 3.5–5.1)
PROT SERPL-MCNC: 7.1 G/DL (ref 6.4–8.2)
RBC # BLD AUTO: 4.31 M/UL (ref 4.2–5.4)
SODIUM SERPL-SCNC: 139 MMOL/L (ref 136–145)
TRIGL SERPL-MCNC: 105 MG/DL
WBC OTHER # BLD: 7 K/UL (ref 4–10.5)

## 2024-09-20 PROCEDURE — 36415 COLL VENOUS BLD VENIPUNCTURE: CPT

## 2024-09-20 PROCEDURE — 83036 HEMOGLOBIN GLYCOSYLATED A1C: CPT

## 2024-09-20 PROCEDURE — 85025 COMPLETE CBC W/AUTO DIFF WBC: CPT

## 2024-09-20 PROCEDURE — 80061 LIPID PANEL: CPT

## 2024-09-20 PROCEDURE — 82306 VITAMIN D 25 HYDROXY: CPT

## 2024-09-20 PROCEDURE — 80053 COMPREHEN METABOLIC PANEL: CPT

## 2024-09-27 SDOH — HEALTH STABILITY: PHYSICAL HEALTH: ON AVERAGE, HOW MANY MINUTES DO YOU ENGAGE IN EXERCISE AT THIS LEVEL?: 0 MIN

## 2024-09-27 SDOH — HEALTH STABILITY: PHYSICAL HEALTH: ON AVERAGE, HOW MANY DAYS PER WEEK DO YOU ENGAGE IN MODERATE TO STRENUOUS EXERCISE (LIKE A BRISK WALK)?: 1 DAY

## 2024-09-30 ENCOUNTER — OFFICE VISIT (OUTPATIENT)
Dept: FAMILY MEDICINE CLINIC | Age: 44
End: 2024-09-30
Payer: COMMERCIAL

## 2024-09-30 ENCOUNTER — HOSPITAL ENCOUNTER (OUTPATIENT)
Age: 44
Discharge: HOME | End: 2024-09-30
Payer: COMMERCIAL

## 2024-09-30 ENCOUNTER — HOSPITAL ENCOUNTER (OUTPATIENT)
Dept: GENERAL RADIOLOGY | Age: 44
Discharge: HOME | End: 2024-09-30
Payer: COMMERCIAL

## 2024-09-30 DIAGNOSIS — R06.83 SNORING: ICD-10-CM

## 2024-09-30 DIAGNOSIS — G47.33 OSA (OBSTRUCTIVE SLEEP APNEA): ICD-10-CM

## 2024-09-30 DIAGNOSIS — M25.552 LEFT HIP PAIN: ICD-10-CM

## 2024-09-30 DIAGNOSIS — E55.9 VITAMIN D DEFICIENCY: ICD-10-CM

## 2024-09-30 DIAGNOSIS — E78.00 PURE HYPERCHOLESTEROLEMIA: ICD-10-CM

## 2024-09-30 DIAGNOSIS — Z76.89 ENCOUNTER TO ESTABLISH CARE: Primary | ICD-10-CM

## 2024-09-30 DIAGNOSIS — I10 PRIMARY HYPERTENSION: ICD-10-CM

## 2024-09-30 PROCEDURE — 3079F DIAST BP 80-89 MM HG: CPT | Performed by: NURSE PRACTITIONER

## 2024-09-30 PROCEDURE — 99204 OFFICE O/P NEW MOD 45 MIN: CPT | Performed by: NURSE PRACTITIONER

## 2024-09-30 PROCEDURE — G8427 DOCREV CUR MEDS BY ELIG CLIN: HCPCS | Performed by: NURSE PRACTITIONER

## 2024-09-30 PROCEDURE — G8417 CALC BMI ABV UP PARAM F/U: HCPCS | Performed by: NURSE PRACTITIONER

## 2024-09-30 PROCEDURE — 3075F SYST BP GE 130 - 139MM HG: CPT | Performed by: NURSE PRACTITIONER

## 2024-09-30 PROCEDURE — 1036F TOBACCO NON-USER: CPT | Performed by: NURSE PRACTITIONER

## 2024-09-30 PROCEDURE — 73502 X-RAY EXAM HIP UNI 2-3 VIEWS: CPT

## 2024-09-30 RX ORDER — TRAZODONE HYDROCHLORIDE 50 MG/1
TABLET, FILM COATED ORAL
Qty: 60 TABLET | Refills: 1 | Status: SHIPPED | OUTPATIENT
Start: 2024-09-30

## 2024-09-30 SDOH — ECONOMIC STABILITY: FOOD INSECURITY: WITHIN THE PAST 12 MONTHS, YOU WORRIED THAT YOUR FOOD WOULD RUN OUT BEFORE YOU GOT MONEY TO BUY MORE.: NEVER TRUE

## 2024-09-30 SDOH — ECONOMIC STABILITY: FOOD INSECURITY: WITHIN THE PAST 12 MONTHS, THE FOOD YOU BOUGHT JUST DIDN'T LAST AND YOU DIDN'T HAVE MONEY TO GET MORE.: NEVER TRUE

## 2024-09-30 SDOH — ECONOMIC STABILITY: INCOME INSECURITY: HOW HARD IS IT FOR YOU TO PAY FOR THE VERY BASICS LIKE FOOD, HOUSING, MEDICAL CARE, AND HEATING?: NOT HARD AT ALL

## 2024-09-30 NOTE — PROGRESS NOTES
Samina Esquivel   44 y.o.  female  [id number]      Chief Complaint   Patient presents with    New Patient     Establishing care    Sleep Apnea     Unable to get Hydroxyzine filled so sleep apnea has been terrible. Medication helped some but fully effective        Subjective:  44 y.o.female is here for an office visit. She has the following chronic/acute medical problems:  Patient Active Problem List   Diagnosis    Thyroid nodule    Menorrhalgia    Chronic female pelvic pain    Adhesion of intestine    Pelvic adhesions    Right ovarian cyst    Right carpal tunnel syndrome    Left carpal tunnel syndrome    Hypertension    Snoring    Hypersomnolence    Pure hypercholesterolemia    Obesity (BMI 30.0-34.9)    Vitamin D deficiency    JUANCARLOS (obstructive sleep apnea)    Vertigo    Screen for colon cancer       HPI  Vitamin D deficiency   Chronic, at goal (stable), continue current treatment plan    Snoring   Monitored by specialist- no acute findings meriting change in the planuses cpap for this.   Has not been sleepign well.     Pure hypercholesterolemia    Continue with diet/ activity, current tx plan. No change. Labs next visit    JUANCARLOS (obstructive sleep apnea)    Not sleeping well. Trial new medication from prior PCP did not help.  May need to FU with sleep med to have adjustments made.       Hypertension   Chronic, at goal (stable), continue current treatment plan    Not sleepign well.   Pain in left hip- hit with car door about 3 weeks ago. Pain when crossing legs, moving left leg across middle of body. No pain when standing or walking.   Concerned about last labs and would like to review.             Review of Systems   Constitutional: Negative.  Negative for fatigue and unexpected weight change.   Eyes: Negative.  Negative for pain and visual disturbance.   Respiratory: Negative.  Negative for cough, chest tightness and shortness of breath.    Cardiovascular: Negative.  Negative for chest pain, palpitations and

## 2024-10-01 VITALS
OXYGEN SATURATION: 97 % | TEMPERATURE: 98.2 F | HEIGHT: 66 IN | HEART RATE: 79 BPM | SYSTOLIC BLOOD PRESSURE: 130 MMHG | BODY MASS INDEX: 33.4 KG/M2 | DIASTOLIC BLOOD PRESSURE: 80 MMHG | WEIGHT: 207.8 LBS | RESPIRATION RATE: 16 BRPM

## 2024-10-01 ASSESSMENT — ENCOUNTER SYMPTOMS
SHORTNESS OF BREATH: 0
NAUSEA: 0
CHEST TIGHTNESS: 0
EYES NEGATIVE: 1
ABDOMINAL PAIN: 0
GASTROINTESTINAL NEGATIVE: 1
RESPIRATORY NEGATIVE: 1
EYE PAIN: 0
COUGH: 0

## 2024-10-01 NOTE — ASSESSMENT & PLAN NOTE
Not sleeping well. Trial new medication from prior PCP did not help.  May need to FU with sleep med to have adjustments made.

## 2024-10-01 NOTE — ASSESSMENT & PLAN NOTE
Monitored by specialist- no acute findings meriting change in the planuses cpap for this.   Has not been sleepign well.

## 2024-10-06 PROBLEM — Z12.11 SCREEN FOR COLON CANCER: Status: RESOLVED | Noted: 2024-07-01 | Resolved: 2024-10-06

## 2024-10-11 ENCOUNTER — TELEPHONE (OUTPATIENT)
Dept: FAMILY MEDICINE CLINIC | Age: 44
End: 2024-10-11

## 2024-10-11 ENCOUNTER — OFFICE VISIT (OUTPATIENT)
Dept: FAMILY MEDICINE CLINIC | Age: 44
End: 2024-10-11
Payer: COMMERCIAL

## 2024-10-11 VITALS
HEART RATE: 84 BPM | OXYGEN SATURATION: 98 % | SYSTOLIC BLOOD PRESSURE: 134 MMHG | DIASTOLIC BLOOD PRESSURE: 96 MMHG | TEMPERATURE: 98.1 F | WEIGHT: 211 LBS | BODY MASS INDEX: 34.06 KG/M2

## 2024-10-11 DIAGNOSIS — M72.2 PLANTAR FASCIITIS: Primary | ICD-10-CM

## 2024-10-11 PROCEDURE — G8484 FLU IMMUNIZE NO ADMIN: HCPCS | Performed by: NURSE PRACTITIONER

## 2024-10-11 PROCEDURE — G8427 DOCREV CUR MEDS BY ELIG CLIN: HCPCS | Performed by: NURSE PRACTITIONER

## 2024-10-11 PROCEDURE — 3075F SYST BP GE 130 - 139MM HG: CPT | Performed by: NURSE PRACTITIONER

## 2024-10-11 PROCEDURE — G8417 CALC BMI ABV UP PARAM F/U: HCPCS | Performed by: NURSE PRACTITIONER

## 2024-10-11 PROCEDURE — 3080F DIAST BP >= 90 MM HG: CPT | Performed by: NURSE PRACTITIONER

## 2024-10-11 PROCEDURE — 1036F TOBACCO NON-USER: CPT | Performed by: NURSE PRACTITIONER

## 2024-10-11 PROCEDURE — 99213 OFFICE O/P EST LOW 20 MIN: CPT | Performed by: NURSE PRACTITIONER

## 2024-10-11 NOTE — TELEPHONE ENCOUNTER
Spoke with patient regarding her request for appointment to have her right foot evaluated and explained to her that her PCP was out of the office until Wednesday, but offered the patient an appointment with Chris at [facility]. Patient agreed and has been scheduled to come in today after work at 4 PM   Not applicable (known HIV negative status in last year)

## 2024-10-11 NOTE — PATIENT INSTRUCTIONS
We are committed to providing you the best care possible.    If you receive a survey after visiting one of our offices, please take time to share your experience concerning your physician office visit.  These surveys are confidential and no health information about you is shared.    We are eager to improve for you and we are counting on your feedback to help make that happen.    
Attending Attestation (For Attendings USE Only)...

## 2024-10-11 NOTE — PROGRESS NOTES
50 MG tablet Take 1-2 tablets at night as needed for insomnia 60 tablet 1    vitamin D3 (CHOLECALCIFEROL) 25 MCG (1000 UT) TABS tablet TAKE 1 TABLET BY MOUTH EVERY DAY 90 tablet 0    losartan-hydroCHLOROthiazide (HYZAAR) 100-25 MG per tablet TAKE 1 TABLET BY MOUTH EVERY DAY 90 tablet 0    albuterol sulfate HFA (PROVENTIL;VENTOLIN;PROAIR) 108 (90 Base) MCG/ACT inhaler Inhale 2 puffs into the lungs every 4 hours as needed for Wheezing 18 g 0     No current facility-administered medications for this visit.        Past medical, family,surgical history reviewed today.     Objective:  BP (!) 134/96   Pulse 84   Temp 98.1 °F (36.7 °C) (Infrared)   Wt 95.7 kg (211 lb)   LMP 02/07/2021   SpO2 98%   BMI 34.06 kg/m²   BP Readings from Last 3 Encounters:   10/11/24 (!) 134/96   09/30/24 130/80   09/06/24 (!) 136/90     Wt Readings from Last 3 Encounters:   10/11/24 95.7 kg (211 lb)   09/30/24 94.3 kg (207 lb 12.8 oz)   08/28/24 90.7 kg (200 lb)         Physical Exam  Constitutional:       Appearance: Normal appearance.   HENT:      Head: Normocephalic.   Cardiovascular:      Rate and Rhythm: Normal rate and regular rhythm.      Heart sounds: Normal heart sounds.   Pulmonary:      Effort: Pulmonary effort is normal.      Breath sounds: Normal breath sounds.   Musculoskeletal:      Cervical back: Neck supple.        Feet:    Skin:     General: Skin is warm and dry.   Neurological:      Mental Status: She is alert and oriented to person, place, and time.   Psychiatric:         Mood and Affect: Mood normal.         Behavior: Behavior normal.         Lab Results   Component Value Date    WBC 7.0 09/20/2024    HGB 11.6 (L) 09/20/2024    HCT 36.4 (L) 09/20/2024    MCV 84.5 09/20/2024     09/20/2024     Lab Results   Component Value Date     09/20/2024    K 3.8 09/20/2024     09/20/2024    CO2 26 09/20/2024    BUN 11 09/20/2024    CREATININE 0.8 09/20/2024    GLUCOSE 95 09/20/2024    CALCIUM 9.8 09/20/2024

## 2024-10-16 ASSESSMENT — ENCOUNTER SYMPTOMS
CHEST TIGHTNESS: 0
SINUS PRESSURE: 0
DIARRHEA: 0
VOMITING: 0
COUGH: 0
NAUSEA: 0
SORE THROAT: 0
SHORTNESS OF BREATH: 0
WHEEZING: 0
SINUS PAIN: 0
RHINORRHEA: 0

## 2024-10-29 ENCOUNTER — OFFICE VISIT (OUTPATIENT)
Dept: FAMILY MEDICINE CLINIC | Age: 44
End: 2024-10-29
Payer: COMMERCIAL

## 2024-10-29 VITALS
HEART RATE: 77 BPM | BODY MASS INDEX: 33.38 KG/M2 | TEMPERATURE: 98.2 F | DIASTOLIC BLOOD PRESSURE: 86 MMHG | SYSTOLIC BLOOD PRESSURE: 120 MMHG | WEIGHT: 206.8 LBS | OXYGEN SATURATION: 98 %

## 2024-10-29 DIAGNOSIS — M79.605 LEFT LEG PAIN: Primary | ICD-10-CM

## 2024-10-29 DIAGNOSIS — F33.8 SEASONAL DEPRESSION (HCC): ICD-10-CM

## 2024-10-29 PROCEDURE — 99214 OFFICE O/P EST MOD 30 MIN: CPT | Performed by: NURSE PRACTITIONER

## 2024-10-29 PROCEDURE — 3079F DIAST BP 80-89 MM HG: CPT | Performed by: NURSE PRACTITIONER

## 2024-10-29 PROCEDURE — G8417 CALC BMI ABV UP PARAM F/U: HCPCS | Performed by: NURSE PRACTITIONER

## 2024-10-29 PROCEDURE — 1036F TOBACCO NON-USER: CPT | Performed by: NURSE PRACTITIONER

## 2024-10-29 PROCEDURE — G8427 DOCREV CUR MEDS BY ELIG CLIN: HCPCS | Performed by: NURSE PRACTITIONER

## 2024-10-29 PROCEDURE — 3074F SYST BP LT 130 MM HG: CPT | Performed by: NURSE PRACTITIONER

## 2024-10-29 PROCEDURE — G8484 FLU IMMUNIZE NO ADMIN: HCPCS | Performed by: NURSE PRACTITIONER

## 2024-10-29 RX ORDER — VENLAFAXINE 37.5 MG/1
37.5 TABLET ORAL DAILY
Qty: 30 TABLET | Refills: 1 | Status: SHIPPED | OUTPATIENT
Start: 2024-10-29

## 2024-10-29 ASSESSMENT — ENCOUNTER SYMPTOMS
RESPIRATORY NEGATIVE: 1
GASTROINTESTINAL NEGATIVE: 1

## 2024-10-29 NOTE — PROGRESS NOTES
10/29/2024    Samina Leeann Alvin    Chief Complaint   Patient presents with    Leg Pain     Leg pain on left leg    Depression     Seasonal Depression- wants medication       HPI  History of Present Illness  The patient is a 44-year-old individual who presents for evaluation of multiple medical concerns.    Foot Pain  They report an improvement in their foot pain, attributing this to the use of compression therapy.  - Character: Foot pain.  - Alleviating Factors: Compression therapy.  - Severity: Improved.    Leg Injury  They recall an incident where they injured their leg by closing it in a door. Despite being able to walk without difficulty, they experience severe pain when lifting their foot. An x-ray was performed, which did not reveal any abnormalities. The pain is specifically triggered by cross-body motion.  - Onset: Injured leg by closing it in a door.  - Location: Leg.  - Character: Severe pain when lifting foot, triggered by cross-body motion.  - Alleviating/Aggravating Factors: No difficulty walking, pain triggered by specific motion.  - Diagnostic Tests: X-ray did not reveal any abnormalities.    Depression  They express a need for assistance in managing their depression, particularly during the winter months. They have previously been prescribed Lexapro, Zoloft, and Wellbutrin. However, they experienced constant yawning with Wellbutrin and persistent diarrhea with Zoloft.  - Onset: Particularly during the winter months.  - Alleviating/Aggravating Factors: Previously prescribed Lexapro, Zoloft, and Wellbutrin.  - Character: Depression.  - Side Effects: Constant yawning with Wellbutrin, persistent diarrhea with Zoloft.        REVIEW OF SYMPTOMS    Review of Systems   Constitutional: Negative.    Respiratory: Negative.     Cardiovascular: Negative.    Gastrointestinal: Negative.    Musculoskeletal:  Positive for gait problem (foot pain).        Left leg/thigh pain     Neurological:  Negative for

## 2024-11-08 ENCOUNTER — HOSPITAL ENCOUNTER (OUTPATIENT)
Dept: PHYSICAL THERAPY | Age: 44
Setting detail: THERAPIES SERIES
Discharge: HOME | End: 2024-11-08

## 2024-12-09 ENCOUNTER — HOSPITAL ENCOUNTER (OUTPATIENT)
Dept: PHYSICAL THERAPY | Age: 44
Setting detail: THERAPIES SERIES
Discharge: HOME | End: 2024-12-09
Payer: COMMERCIAL

## 2024-12-09 PROCEDURE — 97161 PT EVAL LOW COMPLEX 20 MIN: CPT

## 2024-12-09 PROCEDURE — 97110 THERAPEUTIC EXERCISES: CPT

## 2024-12-09 NOTE — PROGRESS NOTES
Physical Therapy: Initial Evaluation    Patient: Samina Esquivel (44 y.o. female)   Examination Date: 2024  Plan of Care Certification Period: 2024 to        :  1980 ;    Confirmed: Y MRN: [id number]  CSN: [id number]   Insurance: Payor: [place] / Plan: [place] P[place] / Product Type: *No Product type* /   Insurance ID: [id number] - ([place] [employer]) [facility]   Referring Physician: Rupali Arredondo APRN - CNP Laura Willis, CNP   PCP: Rupali Arredondo APRN - CNP Visits to Date/Visits Approved:   /      No Show/Cancelled Appts:   /       Medical Diagnosis: No admission diagnoses are documented for this encounter. Left leg pain  Treatment Diagnosis: LLE pain, LLE weakness     PERTINENT MEDICAL HISTORY   Patient Assessed for Rehabilitation Services: Yes       Medical History: Chart Reviewed: Yes   Past Medical History:   Diagnosis Date    Abdominal gas pain     Asthma     Back pain     Constipation     Depression N/a    Fatigue     Headache     Hypertension 10/13/2023    Kidney stone     Left flank pain     Leg pain     Pelvic pain     Pure hypercholesterolemia 2023     Surgical History:   Past Surgical History:   Procedure Laterality Date    CARPAL TUNNEL RELEASE Right 10/09/2023    RIGHT CARPAL TUNNEL RELEASE performed by Javi Arroyo MD at [facility] OR    CARPAL TUNNEL RELEASE Left 10/23/2023    LEFT CARPAL TUNNEL RELEASE performed by Javi Arroyo MD at [facility] OR    COLONOSCOPY N/A 2024    COLONOSCOPY DIAGNOSTIC performed by Pietro Patel MD at [facility] ENDOSCOPY    CYSTOSCOPY N/A 2021    CYSTOSCOPY performed by Deny Louis MD at [facility] OR    DILATION AND CURETTAGE      ENDOMETRIAL ABLATION      HERNIA REPAIR      HYSTERECTOMY, VAGINAL N/A 2021    [facility] with bilateral salpingectomy     HYSTEROSCOPY      IR BIOPSY THYROID PERC CORE NEEDLE  07/10/2023    IR BIOPSY THYROID PERC CORE NEEDLE 7/10/2023 [facility] SPECIAL PROCEDURES    LAPAROSCOPY N/A

## 2024-12-09 NOTE — FLOWSHEET NOTE
Any changes in Ambulatory Summary Sheet?  None        Objective:  See eval           Exercises: (No more than 4 columns)   Exercise/Equipment 12/9/24 #1 Date Date           WARM UP                     TABLE      Piriformis stretch 2x30\" L     SLR x10     TFL stretch  2x30\" L                    STANDING                                                     PROPRIOCEPTION                                    MODALITIES                      Other Therapeutic Activities/Education:  HEP and importance of completion, POC and goals, anatomy and physiology related to condition    Home Exercise Program: Issued, practiced and pt demo ability to perform 12/9/2024    Manual Treatments:  none      Modalities:  none      Communication with other providers:  POC sent      Assessment: Pt tolerated today's treatment without any adverse reactions or complications this date.  End pain: same  Assessment: Pt is a 44-year-old female who presents to therapy following a L leg injury when she closed it in a car door in early September. Upon assessment, pt does present with weakness and pain during LLE hip flex MMT, and tightness of L TFL and piriformis muscle. Pt does have mild tenderness over proximal IT band on L but did not have a positive modified Obers. Pt does not have pain with PROM/AROM of IR/ER of the L hip when in prone with her hip in a neutral position but does have pain with PROM/AROM of ER when in supine coupled with hip flexion indicating that pain is also coming from hip flexor complex. Pt would benefit from skilled therapy interventions to address listed impairments, progress toward goal completion, and improve ADL/IADL status. PT also warranted to reduce risk for further injury or decline.    Plan for Next Session: bike, IT band rolling, hip flexor STM within pelvic crest region, hip flexor strength in neutral positions, piriformis stretching    Time In / Time Out: 1530 -  1600      Timed Code/Total Treatment Minutes:

## 2024-12-09 NOTE — PLAN OF CARE
Outpatient Physical Therapy           [place]           [x] Phone: [phone]   Fax: [phone]  [place]           [] Phone: [phone]   Fax: [phone]     To:  Rupali Arredondo CNP   From: Casi Patel, PT, DPT     Patient: Samina Esquivel       : 1980  Diagnosis:  Diagnosis: Left leg pain  Treatment Diagnosis: LLE pain, LLE weakness  Date: 2024    Physical Therapy Certification/Re-Certification Form  Dear Rupali Arredondo,  The following patient has been evaluated for physical therapy services and for therapy to continue, insurance requires physician review of the treatment plan initially and every 90 days. Please review the attached evaluation and/or summary of the patient's plan of care, and verify that you agree therapy should continue by signing the attached document and sending it back to our office.    Assessment:    Assessment: Pt is a 44-year-old female who presents to therapy following a L leg injury when she closed it in a car door in early September. Upon assessment, pt does present with weakness and pain during LLE hip flex MMT, and tightness of L TFL and piriformis muscle. Pt does have mild tenderness over proximal IT band on L but did not have a positive modified Obers. Pt does not have pain with PROM/AROM of IR/ER of the L hip when in prone with her hip in a neutral position but does have pain with PROM/AROM of ER when in supine coupled with hip flexion indicating that pain is also coming from hip flexor complex. Pt would benefit from skilled therapy interventions to address listed impairments, progress toward goal completion, and improve ADL/IADL status. PT also warranted to reduce risk for further injury or decline.  Patient agrees with established plan of care and assisted in the development of their short term and long term goals. Patient had no adverse reaction with initial treatment and there are no barriers to learning.  Learning preferences include

## 2024-12-17 ENCOUNTER — HOSPITAL ENCOUNTER (OUTPATIENT)
Dept: PHYSICAL THERAPY | Age: 44
Setting detail: THERAPIES SERIES
Discharge: HOME | End: 2024-12-17
Payer: COMMERCIAL

## 2024-12-17 PROCEDURE — 97140 MANUAL THERAPY 1/> REGIONS: CPT

## 2024-12-17 NOTE — FLOWSHEET NOTE
today. Pt stated that she is having increased pain into her L buttock, down the side of her leg up into her hip. Pt stated that she has not been able to do her stretch with her knee bent and that she is having pain when she lifts her leg up to put it on her knee.       Any changes in Ambulatory Summary Sheet?  None        Objective: Noted  ST and R pelvic rotation that improved after manual treatment.   Pt had decreased ROM with lifting L foot up to put it on R thigh at start of treatment,but able to easily lift L LE and place it on thigh with full ER after manual           Exercises: (No more than 4 columns)   Exercise/Equipment 12/9/24 #1 12/17/2024 #2 Date           WARM UP                     TABLE      Piriformis stretch 2x30\" L     SLR x10 10x    TFL stretch  2x30\" L                    STANDING                                                     PROPRIOCEPTION                                    MODALITIES                      Other Therapeutic Activities/Education:  HEP and importance of completion, POC and goals, anatomy and physiology related to condition    Home Exercise Program: Issued, practiced and pt demo ability to perform 12/9/2024    Manual Treatments: PROM of L hip in all directions MET for ST and MET for pelvic rotation,, hypervolt  massage gun to L buttock, L ITB and L quads, hip flexor x 25' total       Modalities:  none      Communication with other providers:  POC sent      Assessment: Pt tolerated treatment fairly well. Pt responded well to the manual treatment. Pt reported 0/10 after treatment.        Plan for Next Session: bike, IT band rolling, hip flexor STM within pelvic crest region, hip flexor strength in neutral positions, piriformis stretching    Time In / Time Out: 1730/ 1755     Timed Code/Total Treatment Minutes: 25'/25'  2  Man ( 25')     Next Progress Note due:  10th visit      Plan of Care Interventions:  [x] Therapeutic Exercise  [x] Modalities:  [x] Therapeutic Activity     []

## 2024-12-20 ENCOUNTER — HOSPITAL ENCOUNTER (OUTPATIENT)
Dept: PHYSICAL THERAPY | Age: 44
Setting detail: THERAPIES SERIES
Discharge: HOME | End: 2024-12-20
Payer: COMMERCIAL

## 2024-12-20 NOTE — FLOWSHEET NOTE
Physical Therapy  Cancellation/No-show Note  Patient Name:  Samina Esquivel  :  1980   Date:  2024  Cancelled visits to date: 0  No-shows to date: 1    For today's appointment patient:  []  Cancelled  []  Rescheduled appointment  [x]  No-show     Reason given by patient:  []  Patient ill  []  Conflicting appointment  []  No transportation    [x]  Conflict with work  []  No reason given  []  Other:     Comments:  got called into work. Called to cancel but 3 min after apt time    Electronically signed by:  Casi Patel, PT, DPT

## 2024-12-22 DIAGNOSIS — F33.8 SEASONAL DEPRESSION (HCC): ICD-10-CM

## 2024-12-23 RX ORDER — VENLAFAXINE 37.5 MG/1
37.5 TABLET ORAL DAILY
Qty: 30 TABLET | Refills: 0 | Status: SHIPPED | OUTPATIENT
Start: 2024-12-23

## 2024-12-24 ENCOUNTER — HOSPITAL ENCOUNTER (OUTPATIENT)
Dept: PHYSICAL THERAPY | Age: 44
Setting detail: THERAPIES SERIES
Discharge: HOME | End: 2024-12-24
Payer: COMMERCIAL

## 2024-12-24 PROCEDURE — 97110 THERAPEUTIC EXERCISES: CPT

## 2024-12-24 PROCEDURE — 97140 MANUAL THERAPY 1/> REGIONS: CPT

## 2024-12-24 NOTE — FLOWSHEET NOTE
Outpatient Physical Therapy  [place]           [x] Phone: [phone]   Fax: [phone]  [place]           [] Phone: [phone]   Fax: [phone]        Physical Therapy Daily Treatment Note  Date:  2024    Patient Name:  Samina Esquivel    :  1980  MRN: [id number]  Restrictions/Precautions: n/a  Diagnosis:      Left leg pain  Treatment Diagnosis:  LLE pain, LLE weakness  Insurance/Certification information: [facility] - precert  Referring Physician:   Rupali Arredondo CNP   Plan of care signed (Y/N):  sent   Outcome Measure: LEFS: 64  Visit# / total visits:  3/10  Pain level:     4 /10     Goals:     Patient goals: reduce leg pain  Short term goals  Time Frame for Short term goals: Refer to LTG  Long Term Goals  Time Frame for Long Term Goals: 10 visits  Pt will improve L hip flexion MMT to 4+/5 to aide in ADLs  Pt will report overall improvement in condition by 50% or more  Pt will report no pain with L hip flexion coupled with ER while putting on socks to aide in ADL independence        Summary of Evaluation:  Assessment: Pt is a 44-year-old female who presents to therapy following a L leg injury when she closed it in a car door in early September. Upon assessment, pt does present with weakness and pain during LLE hip flex MMT, and tightness of L TFL and piriformis muscle. Pt does have mild tenderness over proximal IT band on L but did not have a positive modified Obers. Pt does not have pain with PROM/AROM of IR/ER of the L hip when in prone with her hip in a neutral position but does have pain with PROM/AROM of ER when in supine coupled with hip flexion indicating that pain is also coming from hip flexor complex. Pt would benefit from skilled therapy interventions to address listed impairments, progress toward goal completion, and improve ADL/IADL status. PT also warranted to reduce risk for further injury or decline.          Subjective:  Pt is doing alright this date but is

## 2024-12-26 ENCOUNTER — PATIENT MESSAGE (OUTPATIENT)
Dept: FAMILY MEDICINE CLINIC | Age: 44
End: 2024-12-26

## 2024-12-26 ENCOUNTER — HOSPITAL ENCOUNTER (OUTPATIENT)
Dept: PHYSICAL THERAPY | Age: 44
Discharge: HOME | End: 2024-12-26

## 2024-12-26 DIAGNOSIS — F33.8 SEASONAL DEPRESSION (HCC): ICD-10-CM

## 2024-12-26 NOTE — FLOWSHEET NOTE
Physical Therapy  Cancellation/No-show Note  Patient Name:  Samina Esquivel  :  1980   Date:  2024  Cancelled visits to date: 0  No-shows to date: 2    For today's appointment patient:  []  Cancelled  []  Rescheduled appointment  [x]  No-show     Reason given by patient:  []  Patient ill  []  Conflicting appointment  []  No transportation    []  Conflict with work  []  No reason given  [x]  Other:     Comments:  thought it was the , confirmed she would be present for next apt    Electronically signed by:  Casi Patel, PT, DPT

## 2024-12-30 RX ORDER — VENLAFAXINE 75 MG/1
75 TABLET ORAL 2 TIMES DAILY
Qty: 60 TABLET | Refills: 2 | Status: SHIPPED | OUTPATIENT
Start: 2024-12-30

## 2025-01-02 ENCOUNTER — HOSPITAL ENCOUNTER (OUTPATIENT)
Dept: PHYSICAL THERAPY | Age: 45
Discharge: HOME | End: 2025-01-02

## 2025-01-02 NOTE — FLOWSHEET NOTE
Physical Therapy  Cancellation/No-show Note  Patient Name:  Samina Esquivel  :  1980   Date:  2025  Cancelled visits to date: 0  No-shows to date: 4    For today's appointment patient:  []  Cancelled  []  Rescheduled appointment  [x]  No-show     Reason given by patient:  []  Patient ill  []  Conflicting appointment  []  No transportation    []  Conflict with work  [x]  No reason given  []  Other:     Comments: Called pt and let her know she will be removed from schedule and dc secondary to clinic attendance policy    Electronically signed by:  Casi Patel, PT, DPT

## 2025-01-02 NOTE — DISCHARGE SUMMARY
Outpatient Physical Therapy  [place]           [x] Phone: [phone]   Fax: [phone]  [place]           [] Phone: [phone]   Fax: [phone]        Physical Therapy Daily Discharge Note  Date:  2025    Patient Name:  Samina Esquivel    :  1980  MRN: [id number]    Restrictions/Precautions: n/a  Diagnosis:      Left leg pain  Treatment Diagnosis:  LLE pain, LLE weakness  Insurance/Certification information: [facility] - precert  Referring Physician:   Rupali Arredondo CNP   Plan of care signed (Y/N):  sent   Outcome Measure: LEFS: 64  Visit# / total visits:  3/10  Pain level:     4 /10     Goals:     Patient goals: reduce leg pain  Short term goals  Time Frame for Short term goals: Refer to LTG  Long Term Goals  Time Frame for Long Term Goals: 10 visits  Pt will improve L hip flexion MMT to 4+/5 to aide in ADLs  Pt will report overall improvement in condition by 50% or more  Pt will report no pain with L hip flexion coupled with ER while putting on socks to aide in ADL independence        Summary of Evaluation:  Assessment: Pt is a 44-year-old female who presents to therapy following a L leg injury when she closed it in a car door in early September. Upon assessment, pt does present with weakness and pain during LLE hip flex MMT, and tightness of L TFL and piriformis muscle. Pt does have mild tenderness over proximal IT band on L but did not have a positive modified Obers. Pt does not have pain with PROM/AROM of IR/ER of the L hip when in prone with her hip in a neutral position but does have pain with PROM/AROM of ER when in supine coupled with hip flexion indicating that pain is also coming from hip flexor complex. Pt would benefit from skilled therapy interventions to address listed impairments, progress toward goal completion, and improve ADL/IADL status. PT also warranted to reduce risk for further injury or decline.       Objective:    Unable to complete an assessment of the

## 2025-01-19 DIAGNOSIS — F33.8 SEASONAL DEPRESSION: ICD-10-CM

## 2025-01-20 RX ORDER — VENLAFAXINE 37.5 MG/1
37.5 TABLET ORAL DAILY
Qty: 30 TABLET | Refills: 0 | OUTPATIENT
Start: 2025-01-20

## 2025-01-27 RX ORDER — TRAZODONE HYDROCHLORIDE 50 MG/1
TABLET, FILM COATED ORAL
Qty: 60 TABLET | Refills: 1 | Status: SHIPPED | OUTPATIENT
Start: 2025-01-27

## 2025-01-27 NOTE — TELEPHONE ENCOUNTER
Last appointment: 10/29/2024   Next Appointment: 3/31/2025     Allergies:  No Known Allergies      Recent Vitals:  Wt Readings from Last 3 Encounters:   10/29/24 93.8 kg (206 lb 12.8 oz)   10/11/24 95.7 kg (211 lb)   09/30/24 94.3 kg (207 lb 12.8 oz)     Ht Readings from Last 3 Encounters:   09/30/24 1.676 m (5' 6\")   08/28/24 1.676 m (5' 6\")   07/16/24 1.676 m (5' 6\")     BP Readings from Last 3 Encounters:   10/29/24 120/86   10/11/24 (!) 134/96   09/30/24 130/80     BMI Readings from Last 3 Encounters:   10/29/24 33.38 kg/m²   10/11/24 34.06 kg/m²   09/30/24 33.54 kg/m²       Recent Labs__________________________________________________________________________    Renal:   Creatinine   Date Value Ref Range Status   09/20/2024 0.8 0.6 - 1.1 mg/dL Final     BUN   Date Value Ref Range Status   09/20/2024 11 7 - 20 mg/dL Final     Sodium   Date Value Ref Range Status   09/20/2024 139 136 - 145 mmol/L Final     Potassium   Date Value Ref Range Status   09/20/2024 3.8 3.5 - 5.1 mmol/L Final     Chloride   Date Value Ref Range Status   09/20/2024 100 99 - 110 mmol/L Final     CO2   Date Value Ref Range Status   09/20/2024 26 21 - 32 mmol/L Final       BMP:    Sodium   Date Value Ref Range Status   09/20/2024 139 136 - 145 mmol/L Final     Potassium   Date Value Ref Range Status   09/20/2024 3.8 3.5 - 5.1 mmol/L Final     Chloride   Date Value Ref Range Status   09/20/2024 100 99 - 110 mmol/L Final     CO2   Date Value Ref Range Status   09/20/2024 26 21 - 32 mmol/L Final     BUN   Date Value Ref Range Status   09/20/2024 11 7 - 20 mg/dL Final     Creatinine   Date Value Ref Range Status   09/20/2024 0.8 0.6 - 1.1 mg/dL Final     Glucose   Date Value Ref Range Status   09/20/2024 95 74 - 99 mg/dL Final     Calcium   Date Value Ref Range Status   09/20/2024 9.8 8.3 - 10.6 mg/dL Final     Est, Glom Filt Rate   Date Value Ref Range Status   09/20/2024 77 >60 mL/min/1.73m2 Final   06/13/2023 >60 >60 Final     Comment:

## 2025-01-29 ENCOUNTER — OFFICE VISIT (OUTPATIENT)
Dept: FAMILY MEDICINE CLINIC | Age: 45
End: 2025-01-29
Payer: COMMERCIAL

## 2025-01-29 VITALS
SYSTOLIC BLOOD PRESSURE: 122 MMHG | OXYGEN SATURATION: 98 % | HEART RATE: 87 BPM | TEMPERATURE: 98 F | DIASTOLIC BLOOD PRESSURE: 92 MMHG

## 2025-01-29 DIAGNOSIS — J45.20 MILD INTERMITTENT ASTHMA, UNCOMPLICATED: Primary | ICD-10-CM

## 2025-01-29 PROCEDURE — 3074F SYST BP LT 130 MM HG: CPT | Performed by: NURSE PRACTITIONER

## 2025-01-29 PROCEDURE — 1036F TOBACCO NON-USER: CPT | Performed by: NURSE PRACTITIONER

## 2025-01-29 PROCEDURE — 99213 OFFICE O/P EST LOW 20 MIN: CPT | Performed by: NURSE PRACTITIONER

## 2025-01-29 PROCEDURE — 3080F DIAST BP >= 90 MM HG: CPT | Performed by: NURSE PRACTITIONER

## 2025-01-29 PROCEDURE — G8417 CALC BMI ABV UP PARAM F/U: HCPCS | Performed by: NURSE PRACTITIONER

## 2025-01-29 PROCEDURE — G8427 DOCREV CUR MEDS BY ELIG CLIN: HCPCS | Performed by: NURSE PRACTITIONER

## 2025-01-29 RX ORDER — ALBUTEROL SULFATE 90 UG/1
2 INHALANT RESPIRATORY (INHALATION) EVERY 6 HOURS PRN
Qty: 18 G | Refills: 5 | Status: SHIPPED | OUTPATIENT
Start: 2025-01-29

## 2025-01-29 RX ORDER — BUDESONIDE AND FORMOTEROL FUMARATE DIHYDRATE 80; 4.5 UG/1; UG/1
2 AEROSOL RESPIRATORY (INHALATION)
Qty: 1 EACH | Refills: 5 | Status: SHIPPED | OUTPATIENT
Start: 2025-01-29

## 2025-01-29 RX ORDER — PREDNISONE 10 MG/1
TABLET ORAL
Qty: 21 TABLET | Refills: 0 | Status: SHIPPED | OUTPATIENT
Start: 2025-01-29 | End: 2025-02-04

## 2025-01-29 SDOH — ECONOMIC STABILITY: FOOD INSECURITY: WITHIN THE PAST 12 MONTHS, THE FOOD YOU BOUGHT JUST DIDN'T LAST AND YOU DIDN'T HAVE MONEY TO GET MORE.: NEVER TRUE

## 2025-01-29 SDOH — ECONOMIC STABILITY: FOOD INSECURITY: WITHIN THE PAST 12 MONTHS, YOU WORRIED THAT YOUR FOOD WOULD RUN OUT BEFORE YOU GOT MONEY TO BUY MORE.: NEVER TRUE

## 2025-01-29 ASSESSMENT — PATIENT HEALTH QUESTIONNAIRE - PHQ9
7. TROUBLE CONCENTRATING ON THINGS, SUCH AS READING THE NEWSPAPER OR WATCHING TELEVISION: MORE THAN HALF THE DAYS
SUM OF ALL RESPONSES TO PHQ QUESTIONS 1-9: 2
5. POOR APPETITE OR OVEREATING: NOT AT ALL
8. MOVING OR SPEAKING SO SLOWLY THAT OTHER PEOPLE COULD HAVE NOTICED. OR THE OPPOSITE, BEING SO FIGETY OR RESTLESS THAT YOU HAVE BEEN MOVING AROUND A LOT MORE THAN USUAL: NOT AT ALL
4. FEELING TIRED OR HAVING LITTLE ENERGY: NOT AT ALL
9. THOUGHTS THAT YOU WOULD BE BETTER OFF DEAD, OR OF HURTING YOURSELF: NOT AT ALL
6. FEELING BAD ABOUT YOURSELF - OR THAT YOU ARE A FAILURE OR HAVE LET YOURSELF OR YOUR FAMILY DOWN: NOT AT ALL
SUM OF ALL RESPONSES TO PHQ QUESTIONS 1-9: 2
SUM OF ALL RESPONSES TO PHQ QUESTIONS 1-9: 2
3. TROUBLE FALLING OR STAYING ASLEEP: NOT AT ALL
2. FEELING DOWN, DEPRESSED OR HOPELESS: NOT AT ALL
10. IF YOU CHECKED OFF ANY PROBLEMS, HOW DIFFICULT HAVE THESE PROBLEMS MADE IT FOR YOU TO DO YOUR WORK, TAKE CARE OF THINGS AT HOME, OR GET ALONG WITH OTHER PEOPLE: NOT DIFFICULT AT ALL
SUM OF ALL RESPONSES TO PHQ QUESTIONS 1-9: 2

## 2025-01-29 NOTE — PROGRESS NOTES
1/29/2025    Samina Esquivel    Chief Complaint   Patient presents with    Asthma     Pt reports chest feeling tight, inhaler not helping.  Pt c/o cough with no relief. Dry cough and runny nose        HPI  History of Present Illness  The patient is a 44-year-old individual who presents for evaluation of asthma.    Asthma Symptoms  They report experiencing coughing spells and chest tightness, which they attribute to their asthma. These symptoms began approximately 2 to 3 days ago. They have been utilizing their inhaler but note that it has not provided any relief. They do not experience wheezing, except during the night. They also report no fever, sore throat, or drainage. They are around kids every day, but none of them have been out with the flu or anything weird.  - Onset: Symptoms began approximately 2 to 3 days ago.  - Location: Chest.  - Character: Coughing spells and chest tightness.  - Alleviating/Aggravating Factors: Utilizing inhaler with no relief.  - Timing: Wheezing occurs only during the night.  - Severity: Inhaler has not provided any relief.    MEDICATIONS  Current: trazodone        REVIEW OF SYMPTOMS    Review of Systems see corby          1/29/2025     8:11 AM 4/16/2024     9:16 AM 6/9/2023     3:36 PM 8/17/2021    11:05 AM   PHQ Scores   PHQ2 Score  0 2 1   PHQ9 Score 2 10 16 1     Interpretation of Total Score Depression Severity: 1-4 = Minimal depression, 5-9 = Mild depression, 10-14 = Moderate depression, 15-19 = Moderately severe depression, 20-27 = Severe depression         6/9/2023     3:38 PM   LUH 7 SCORE   LUH-7 Total Score 11     Interpretation of LUH-7 score: 5-9 = mild anxiety, 10-14 = moderate anxiety, 15+ = severe anxiety. Recommend referral to behavioral health for scores 10 or greater.    PAST MEDICAL HISTORY  Past Medical History:   Diagnosis Date    Abdominal gas pain     Asthma     Back pain     Constipation     Depression N/a    Fatigue     Headache     Hypertension

## 2025-01-29 NOTE — PATIENT INSTRUCTIONS
Learning About Asthma  What is asthma?     Asthma is a lifelong condition that can make it hard to breathe. It causes the airways that lead to the lungs to swell and get inflamed.  Some people have a hard time breathing only at certain times. This may be during allergy season, when they get a cold, or when they exercise. Others have breathing problems a lot of the time.  When asthma symptoms suddenly get worse (or flare up), the airways tighten and become narrower. This makes it hard to breathe, and you may wheeze or cough. These flare-ups are also called asthma attacks or exacerbations (say \"ko-LEB-au-BAY-millicent\").  Even though asthma is a lifelong condition, treatment can help you feel and breathe better and help keep your lungs healthy.  What are the symptoms of asthma?  When you have asthma, you may:  Wheeze, making a loud or soft whistling noise when you breathe in and out.  Cough a lot. This is the only symptom for some people.  Feel tightness in your chest.  Feel short of breath. You may have rapid, shallow breathing or trouble breathing.  Have trouble sleeping because you're coughing or having a hard time breathing.  Get tired quickly during exercise.  Symptoms may start soon after you're around things (triggers) that cause your asthma attacks. This is an early phase response. Or they may start several hours after exposure (late phase response). A late phase response can make it harder to figure out what triggers your symptoms.  Symptoms can be mild or severe. You may have symptoms daily or just now and then. Or you may have something in between.  Some people have symptoms that get worse at night, such as a cough and shortness of breath.  How is it treated?  Asthma is treated with medicine to help you breathe easier, along with self-care.  Medicines used to treat asthma include:  Controller medicines.   These medicines prevent asthma attacks, stop problems before they happen, and reduce inflammation in

## 2025-02-28 DIAGNOSIS — I10 HYPERTENSION, UNSPECIFIED TYPE: ICD-10-CM

## 2025-02-28 RX ORDER — LOSARTAN POTASSIUM AND HYDROCHLOROTHIAZIDE 25; 100 MG/1; MG/1
1 TABLET ORAL DAILY
Qty: 90 TABLET | Refills: 0 | Status: SHIPPED | OUTPATIENT
Start: 2025-02-28

## 2025-03-25 RX ORDER — TRAZODONE HYDROCHLORIDE 50 MG/1
TABLET ORAL
Qty: 180 TABLET | Refills: 3 | Status: SHIPPED | OUTPATIENT
Start: 2025-03-25

## 2025-03-25 NOTE — TELEPHONE ENCOUNTER
Medication:   Requested Prescriptions     Pending Prescriptions Disp Refills    traZODone (DESYREL) 50 MG tablet [Pharmacy Med Name: traZODone HCl Oral Tablet 50 MG] 60 tablet 0     Sig: Take 1-2 tablets BY MOUTH at night as needed for insomnia        Last Filled:      Patient Phone Number: [phone] (home)     Last appt: 1/29/2025   Next appt: 3/31/2025    Last OARRS:        No data to display

## 2025-03-31 ENCOUNTER — OFFICE VISIT (OUTPATIENT)
Dept: FAMILY MEDICINE CLINIC | Age: 45
End: 2025-03-31
Payer: COMMERCIAL

## 2025-03-31 VITALS
DIASTOLIC BLOOD PRESSURE: 80 MMHG | HEIGHT: 66 IN | OXYGEN SATURATION: 98 % | RESPIRATION RATE: 18 BRPM | HEART RATE: 68 BPM | SYSTOLIC BLOOD PRESSURE: 130 MMHG | WEIGHT: 200.4 LBS | BODY MASS INDEX: 32.21 KG/M2 | TEMPERATURE: 98.2 F

## 2025-03-31 DIAGNOSIS — E55.9 VITAMIN D DEFICIENCY: Primary | ICD-10-CM

## 2025-03-31 DIAGNOSIS — I10 PRIMARY HYPERTENSION: ICD-10-CM

## 2025-03-31 DIAGNOSIS — J45.20 MILD INTERMITTENT ASTHMA, UNCOMPLICATED: ICD-10-CM

## 2025-03-31 DIAGNOSIS — G47.09 OTHER INSOMNIA: ICD-10-CM

## 2025-03-31 DIAGNOSIS — E66.1 CLASS 1 DRUG-INDUCED OBESITY WITH SERIOUS COMORBIDITY AND BODY MASS INDEX (BMI) OF 32.0 TO 32.9 IN ADULT: ICD-10-CM

## 2025-03-31 DIAGNOSIS — E66.811 CLASS 1 DRUG-INDUCED OBESITY WITH SERIOUS COMORBIDITY AND BODY MASS INDEX (BMI) OF 32.0 TO 32.9 IN ADULT: ICD-10-CM

## 2025-03-31 DIAGNOSIS — E78.00 PURE HYPERCHOLESTEROLEMIA: ICD-10-CM

## 2025-03-31 DIAGNOSIS — R73.09 ELEVATED GLUCOSE: ICD-10-CM

## 2025-03-31 PROCEDURE — 3075F SYST BP GE 130 - 139MM HG: CPT | Performed by: NURSE PRACTITIONER

## 2025-03-31 PROCEDURE — 3079F DIAST BP 80-89 MM HG: CPT | Performed by: NURSE PRACTITIONER

## 2025-03-31 PROCEDURE — 99214 OFFICE O/P EST MOD 30 MIN: CPT | Performed by: NURSE PRACTITIONER

## 2025-03-31 RX ORDER — TRAZODONE HYDROCHLORIDE 100 MG/1
TABLET ORAL
Qty: 90 TABLET | Refills: 3 | Status: SHIPPED | OUTPATIENT
Start: 2025-03-31

## 2025-03-31 NOTE — PROGRESS NOTES
Jayjuan Leeann Alvin   44 y.o.  female  [id number]      Chief Complaint   Patient presents with    6 Month Follow-Up     6 month f/u     Weight Management     Patient would like to discuss starting back on adipex         Subjective:  44 y.o.female is here for an office visit. She has the following chronic/acute medical problems:  Patient Active Problem List   Diagnosis    Thyroid nodule    Menorrhalgia    Chronic female pelvic pain    Adhesion of intestine    Pelvic adhesions    Right ovarian cyst    Right carpal tunnel syndrome    Left carpal tunnel syndrome    Hypertension    Snoring    Hypersomnolence    Pure hypercholesterolemia    Class 1 drug-induced obesity with serious comorbidity and body mass index (BMI) of 32.0 to 32.9 in adult    Vitamin D deficiency    JUANCARLOS (obstructive sleep apnea)    Vertigo    Mild intermittent asthma, uncomplicated    Other insomnia       HPI  Vitamin D deficiency  Labs ordered. Have done in the next week.      Pure hypercholesterolemia  Labs ordered to have done this next week when fasting       Class 1 drug-induced obesity with serious comorbidity and body mass index (BMI) of 32.0 to 32.9 in adult  Referral to weight mgmt. Wants to start back on adipex       Hypertension   Chronic, at goal (stable), continue current treatment plan    Mild intermittent asthma, uncomplicated   Chronic, at goal (stable), continue current treatment plan    Insomnia- trazodone working well.         Review of Systems see hpi    Current Outpatient Medications   Medication Sig Dispense Refill    traZODone (DESYREL) 100 MG tablet Take 1 BY MOUTH at night as needed for insomnia 90 tablet 3    losartan-hydroCHLOROthiazide (HYZAAR) 100-25 MG per tablet Take 1 tablet by mouth daily 90 tablet 0    budesonide-formoterol (SYMBICORT) 80-4.5 MCG/ACT AERO Inhale 2 puffs into the lungs in the morning and 2 puffs in the evening. 1 each 5    venlafaxine (EFFEXOR) 75 MG tablet Take 1 tablet by mouth 2 times daily 60

## 2025-07-16 ENCOUNTER — HOSPITAL ENCOUNTER (OUTPATIENT)
Dept: MAMMOGRAPHY | Age: 45
Discharge: HOME | End: 2025-07-16
Payer: COMMERCIAL

## 2025-07-16 DIAGNOSIS — Z12.31 SCREENING MAMMOGRAM, ENCOUNTER FOR: ICD-10-CM

## 2025-07-16 PROCEDURE — 77063 BREAST TOMOSYNTHESIS BI: CPT

## 2025-08-10 DIAGNOSIS — I10 HYPERTENSION, UNSPECIFIED TYPE: ICD-10-CM

## 2025-08-11 RX ORDER — LOSARTAN POTASSIUM AND HYDROCHLOROTHIAZIDE 25; 100 MG/1; MG/1
1 TABLET ORAL DAILY
Qty: 90 TABLET | Refills: 1 | Status: SHIPPED | OUTPATIENT
Start: 2025-08-11

## (undated) DEVICE — GLOVE ORANGE PI 7 1/2   MSG9075

## (undated) DEVICE — GLOVE SURG SZ 65 L12IN FNGR THK79MIL GRN LTX FREE

## (undated) DEVICE — MARKER SURG SKIN UTIL REGULAR/FINE 2 TIP W/ RUL AND 9 LBL

## (undated) DEVICE — INTENDED FOR TISSUE SEPARATION, AND OTHER PROCEDURES THAT REQUIRE A SHARP SURGICAL BLADE TO PUNCTURE OR CUT.: Brand: BARD-PARKER ® STAINLESS STEEL BLADES

## (undated) DEVICE — DEVICE ES L3M EDGE COAT HEX LOK BLDE ELECTRD HOLSTER FORC

## (undated) DEVICE — SUTURE NONABSORBABLE MONOFILAMENT 4-0 FS-2 18 IN ETHILON 662H

## (undated) DEVICE — SET TBNG DISP TIP FOR AHTO

## (undated) DEVICE — SOLUTION IRRIG 1000ML 09% SOD CHL USP PIC PLAS CONTAINER

## (undated) DEVICE — ENDOSCOPY KIT: Brand: MEDLINE INDUSTRIES, INC.

## (undated) DEVICE — SEALER ENDOSCP L37CM NANO COAT BLNT TIP LAP DIV

## (undated) DEVICE — VIAL ACCESS CANNULA,SMART TIP: Brand: MONOJECT

## (undated) DEVICE — TOWEL,OR,DSP,ST,BLUE,STD,6/PK,12PK/CS: Brand: MEDLINE

## (undated) DEVICE — GOWN,SIRUS,POLYRNF,BRTHSLV,XLN/XL,20/CS: Brand: MEDLINE

## (undated) DEVICE — DEVICE SUT VLT PRELD W/ POLYSRB 2-0 L48IN SUT DISP FOR LAP

## (undated) DEVICE — ZIMMER® STERILE DISPOSABLE TOURNIQUET CUFF WITH PLC, DUAL PORT, SINGLE BLADDER, 18 IN. (46 CM)

## (undated) DEVICE — COUNTER NDL 30 COUNT FOAM STRP SGL MAG

## (undated) DEVICE — GLOVE ORANGE PI 8   MSG9080

## (undated) DEVICE — THREE QUARTER SHEET: Brand: CONVERTORS

## (undated) DEVICE — GLOVE SURG SZ 75 CRM LTX FREE POLYISOPRENE POLYMER BEAD ANTI

## (undated) DEVICE — SHEET,DRAPE,53X77,STERILE: Brand: MEDLINE

## (undated) DEVICE — NEEDLE HYPO 23GA L1.5IN TURQ POLYPR HUB S STL THN WALL IM

## (undated) DEVICE — GLOVE SURG SZ 6 CRM LTX FREE POLYISOPRENE POLYMER BEAD ANTI

## (undated) DEVICE — TUBING INSUFFLATOR HEAT HI FLO SET PNEUMOCLEAR

## (undated) DEVICE — APPLIER CLP M L L11.4IN DIA10MM ENDOSCP ROT MULT FOR LIG

## (undated) DEVICE — PADDING CAST 2INW X 4YDL COTTON RAYON ROLYAN LATEX FREE

## (undated) DEVICE — BANDAGE COMPR W2INXL5.5YD BGE POLY COT BLEND YARN HNY STRP

## (undated) DEVICE — STERILE LATEX POWDER-FREE SURGICAL GLOVESWITH NITRILE COATING: Brand: PROTEXIS

## (undated) DEVICE — CORD,CAUTERY,BIPOLAR,STERILE: Brand: MEDLINE

## (undated) DEVICE — GAUZE,SPONGE,4"X4",16PLY,XRAY,STRL,LF: Brand: MEDLINE

## (undated) DEVICE — DRAPE, LAVH, STERILE: Brand: MEDLINE

## (undated) DEVICE — KIT,ANTI FOG,W/SPONGE & FLUID,SOFT PACK: Brand: MEDLINE

## (undated) DEVICE — APPLICATOR MEDICATED 26 CC SOLUTION HI LT ORNG CHLORAPREP

## (undated) DEVICE — YANKAUER,FLEXIBLE HANDLE,REGLR CAPACITY: Brand: MEDLINE INDUSTRIES, INC.

## (undated) DEVICE — SOLUTION IV IRRIG POUR BRL 0.9% SODIUM CHL 2F7124

## (undated) DEVICE — PACK,BASIC,IX: Brand: MEDLINE

## (undated) DEVICE — SYRINGE MED 10ML LUERLOCK TIP W/O SFTY DISP

## (undated) DEVICE — SUTURE MCRYL SZ 4-0 L18IN ABSRB UD L19MM PS-2 3/8 CIR PRIM Y496G

## (undated) DEVICE — ELECTRODE ENDOSCP L36CM PTFE SPAT CRV DISP CLEANCOAT

## (undated) DEVICE — BAG SPEC REM 224ML W4XL6IN DIA10MM 1 HND GYN DISP ENDOPCH

## (undated) DEVICE — TROCAR: Brand: KII® SLEEVE

## (undated) DEVICE — GLOVE SURG SZ 7 CRM LTX FREE POLYISOPRENE POLYMER BEAD ANTI

## (undated) DEVICE — DRAINBAG,ANTI-REFLUX TOWER,L/F,2000ML,LL: Brand: MEDLINE

## (undated) DEVICE — BANDAGE ADH W1XL3IN NAT FAB WVN FLX DURABLE N ADH PD SEAL

## (undated) DEVICE — CONTAINER,SPECIMEN,OR STERILE,4OZ: Brand: MEDLINE

## (undated) DEVICE — NEEDLE HYPO 25GA L1.5IN BLU POLYPR HUB S STL REG BVL STR

## (undated) DEVICE — GLOVE ORANGE PI 7   MSG9070

## (undated) DEVICE — SYRINGE IRRIG 60ML SFT PLIABLE BLB EZ TO GRP 1 HND USE W/

## (undated) DEVICE — SUTURE VCRL SZ 0 L27IN ABSRB VLT L36MM CT-1 1/2 CIR J340H

## (undated) DEVICE — Z DUP USE 2522782 SOLUTION IRRIG 1000ML STRL H2O PLAS CONTAINER UROMATIC

## (undated) DEVICE — DRAPE SURGICAL HAND PROX AURORA

## (undated) DEVICE — LAVH/LAPAROSCOPY PACK: Brand: CONVERTORS

## (undated) DEVICE — TOTAL TRAY, 16FR 10ML SIL FOLEY, URN: Brand: MEDLINE

## (undated) DEVICE — DRAPE SHEET ULTRAGARD: Brand: MEDLINE

## (undated) DEVICE — CATHETER,URETHRAL,VINYL,MALE,16",16 FR: Brand: MEDLINE

## (undated) DEVICE — SPONGE GZ W4XL8IN COT WVN 12 PLY

## (undated) DEVICE — BANDAGE COMPR 2IN X5YD SELF ADH RED N STRLE CO FLX

## (undated) DEVICE — COVER,MAYO STAND,STERILE: Brand: MEDLINE

## (undated) DEVICE — BANDAGE,ELASTIC,ESMARK,STERILE,4"X9',LF: Brand: MEDLINE

## (undated) DEVICE — TUBING, SUCTION, 9/32" X 10', STRAIGHT: Brand: MEDLINE

## (undated) DEVICE — SUTURE VCRL SZ 1 L27IN ABSRB UD L36MM CT-1 1/2 CIR JJ40G

## (undated) DEVICE — ELECTRODE ES AD CRDLSS PT RET REM POLYHESIVE

## (undated) DEVICE — DEVICE SUT SHFT L34CM DIA 10MM 2 JAW LD UNIT ENDOSTCH

## (undated) DEVICE — PAD MATERNITY CURITY ADH STRIP DISP

## (undated) DEVICE — SUTURE MCRYL SZ 4-0 L27IN ABSRB UD L24MM PS-1 3/8 CIR PRIM Y935H

## (undated) DEVICE — TROCAR: Brand: KII FIOS FIRST ENTRY

## (undated) DEVICE — PENCIL ES CRD L10FT HND SWCHING ROCK SWCH W/ EDGE COAT BLDE

## (undated) DEVICE — Z INACTIVE USE 2660664 SOLUTION IRRIG 3000ML 0.9% SOD CHL USP UROMATIC PLAS CONT

## (undated) DEVICE — BLADE CLIPPER GEN PURP NS